# Patient Record
Sex: FEMALE | Race: WHITE | NOT HISPANIC OR LATINO | Employment: OTHER | URBAN - NONMETROPOLITAN AREA
[De-identification: names, ages, dates, MRNs, and addresses within clinical notes are randomized per-mention and may not be internally consistent; named-entity substitution may affect disease eponyms.]

---

## 2017-01-05 ENCOUNTER — HOSPITAL ENCOUNTER (OUTPATIENT)
Dept: CARDIOLOGY | Facility: HOSPITAL | Age: 47
Discharge: HOME | End: 2017-01-05
Attending: INTERNAL MEDICINE

## 2017-01-05 LAB
APTT BLD: 28 SEC (ref 25–36)
BASOPHILS # BLD AUTO: 0.11 THOUS (ref 0–0.2)
BASOPHILS NFR BLD AUTO: 1.3 % (ref 0–2.5)
CONV ABS IMM GRAN: 0.02 THOUS (ref 0–0.6)
CONV IMMATURE GRAN: 0.2 % (ref 0–2.5)
EOSINOPHIL # BLD AUTO: 0.52 THOUS (ref 0–0.7)
EOSINOPHIL # BLD AUTO: 6 % (ref 0–7)
ERYTHROCYTE [DISTWIDTH] IN BLOOD BY AUTOMATED COUNT: 12.5 % (ref 11.5–14.5)
HCG UR QL: NEGATIVE
HCT VFR BLD AUTO: 43 % (ref 37–47)
HGB BLD-MCNC: 14.2 G/DL (ref 12–16)
INR PPP: 1 (ref 0.9–1.1)
LYMPHOCYTES # BLD AUTO: 3.71 THOUS (ref 0.6–3.4)
LYMPHOCYTES NFR BLD AUTO: 42.5 % (ref 10–50)
MCH RBC QN AUTO: 31.9 UUG (ref 27–31)
MCHC RBC AUTO-ENTMCNC: 33 G/DL (ref 30–37)
MCV RBC AUTO: 96.6 FL (ref 81–99)
MONOCYTES # BLD AUTO: 0.71 THOUS (ref 0–0.9)
MONOCYTES NFR BLD MANUAL: 8.1 % (ref 0–12)
NEUTROPHILS # BLD MANUAL: 3.65 THOUS (ref 2–6.9)
NEUTROPHILS NFR BLD AUTO: 41.9 % (ref 37–80)
PLATELET # BLD AUTO: 228 THOUS (ref 130–400)
PROTHROMBIN TIME: 11.1 SEC (ref 9.3–12.1)
RBC # BLD AUTO: 4.45 M/UL (ref 4.2–5.4)
WBC # BLD AUTO: 8.7 THOUS (ref 4.8–10.8)

## 2017-01-20 ENCOUNTER — OFFICE VISIT (OUTPATIENT)
Dept: FAMILY MEDICINE CLINIC | Facility: CLINIC | Age: 47
End: 2017-01-20

## 2017-01-20 VITALS
RESPIRATION RATE: 16 BRPM | HEART RATE: 98 BPM | SYSTOLIC BLOOD PRESSURE: 127 MMHG | HEIGHT: 60 IN | DIASTOLIC BLOOD PRESSURE: 69 MMHG | OXYGEN SATURATION: 100 % | TEMPERATURE: 98 F | BODY MASS INDEX: 43 KG/M2 | WEIGHT: 219 LBS

## 2017-01-20 DIAGNOSIS — B18.2 CHRONIC HEPATITIS C WITHOUT HEPATIC COMA (HCC): Primary | ICD-10-CM

## 2017-01-20 PROCEDURE — 99213 OFFICE O/P EST LOW 20 MIN: CPT | Performed by: INTERNAL MEDICINE

## 2017-01-20 RX ORDER — PAROXETINE HYDROCHLORIDE 20 MG/1
TABLET, FILM COATED ORAL
COMMUNITY
Start: 2017-01-13 | End: 2017-07-24

## 2017-01-20 RX ORDER — OMEPRAZOLE 20 MG/1
CAPSULE, DELAYED RELEASE ORAL
COMMUNITY
Start: 2017-01-13 | End: 2020-03-12

## 2017-01-20 NOTE — MR AVS SNAPSHOT
"                        Dorita Shrestha   1/20/2017 3:15 PM   Office Visit    Dept Phone:  [phone]   Encounter #:  [id number]    Provider:  Remy Ceballos MD   Department:  [facility] PRIMARY CARE                Your Full Care Plan              Today's Medication Changes          These changes are accurate as of: 1/20/17  4:49 PM.  If you have any questions, ask your nurse or doctor.               Stop taking medication(s)listed here:     citalopram 20 MG tablet   Commonly known as:  CeleXA   Stopped by:  Remy Ceballos MD           raNITIdine 150 MG tablet   Commonly known as:  ZANTAC   Stopped by:  Remy Ceballos MD                      Your Updated Medication List          This list is accurate as of: 1/20/17  4:49 PM.  Always use your most recent med list.                hydrOXYzine 25 MG capsule   Commonly known as:  VISTARIL       ibuprofen 800 MG tablet   Commonly known as:  ADVIL,MOTRIN       omeprazole 20 MG capsule   Commonly known as:  priLOSEC       PARoxetine 20 MG tablet   Commonly known as:  PAXIL       SUBOXONE 8-2 MG film film   Generic drug:  buprenorphine-naloxone               Instructions     None    Patient Instructions History      Upcoming Appointments     Visit Type Date Time Department    OFFICE VISIT 1/20/2017  3:15 PM [facility]LIE QUINN R      [employer] Signup     Our records indicate that you have declined [facility] [employer] signup. If you would like to sign up for [employer], please email [email or web address] or call [phone] to obtain an activation code.             Other Info from Your Visit           Allergies     No Known Allergies      Reason for Visit     Hepatitis C Patient here to follow-up on hepatitis      Vital Signs     Blood Pressure Pulse Temperature Respirations Height Weight    127/69 (BP Location: Left arm, Patient Position: Sitting, Cuff Size: Adult) 98 98 °F (36.7 °C) (Oral) 16 60\" (152.4 cm) 219 lb (99.3 kg)    Oxygen Saturation Body " Mass Index Smoking Status             100% 42.77 kg/m2 Current Every Day Smoker

## 2017-01-24 NOTE — PROGRESS NOTES
Subjective   Dorita Shrestha is a 46 y.o. female.     Chief Complaint   Patient presents with   • Hepatitis C     Patient here to follow-up on hepatitis       History of Present Illness   Patient is here for f/u. She underwent liver biopsy. Initial application for approval of [place] was denies by her insurance.    The following portions of the patient's history were reviewed and updated as appropriate: allergies, current medications, past family history, past medical history, past social history, past surgical history and problem list.    Past Medical History   Diagnosis Date   • Arthritis    • Fibromyalgia, primary    • Headache    • Infectious viral hepatitis    • Kidney stone        Past Surgical History   Procedure Laterality Date   • Cholecystectomy     •  section     • Kidney stone surgery     • Anal fissurectomy         Current Outpatient Prescriptions on File Prior to Visit   Medication Sig Dispense Refill   • hydrOXYzine (VISTARIL) 25 MG capsule      • ibuprofen (ADVIL,MOTRIN) 800 MG tablet TK 1 T PO BID PRN P TO RIGHT SHOULDER  0   • SUBOXONE 8-2 MG film film        No current facility-administered medications on file prior to visit.        Social History     Social History   • Marital status: Legally      Spouse name: N/A   • Number of children: N/A   • Years of education: N/A     Occupational History   • Not on file.     Social History Main Topics   • Smoking status: Current Every Day Smoker     Packs/day: 0.50     Types: Cigarettes   • Smokeless tobacco: Never Used   • Alcohol use No   • Drug use: No   • Sexual activity: Defer     Other Topics Concern   • Not on file     Social History Narrative       Review of Systems   Constitutional: Negative for chills, fatigue and fever.   HENT: Negative for congestion, ear pain, rhinorrhea, sinus pressure and sore throat.    Eyes: Negative for visual disturbance.   Respiratory: Negative for cough, chest tightness, shortness of breath and  "wheezing.    Cardiovascular: Negative for chest pain, palpitations and leg swelling.   Gastrointestinal: Negative for abdominal pain, blood in stool, constipation, diarrhea, nausea and vomiting.   Endocrine: Negative for polydipsia and polyuria.   Genitourinary: Negative for dysuria and hematuria.   Musculoskeletal: Negative for back pain.   Skin: Negative for rash.   Neurological: Negative for dizziness, light-headedness, numbness and headaches.   Psychiatric/Behavioral: Negative for dysphoric mood and sleep disturbance. The patient is not nervous/anxious.        Objective   Blood pressure 127/69, pulse 98, temperature 98 °F (36.7 °C), temperature source Oral, resp. rate 16, height 60\" (152.4 cm), weight 219 lb (99.3 kg), SpO2 100 %.    Physical Exam   Constitutional: She is oriented to person, place, and time. She appears well-nourished. No distress.   HENT:   Head: Atraumatic.   Right Ear: External ear normal.   Left Ear: External ear normal.   Eyes: Conjunctivae are normal. Right eye exhibits no discharge. Left eye exhibits no discharge.   Neck: Normal range of motion. Neck supple.   Cardiovascular: Normal rate and regular rhythm.    No murmur heard.  Pulmonary/Chest: Effort normal and breath sounds normal. She has no wheezes. She has no rales.   Abdominal: Soft. Bowel sounds are normal. She exhibits no distension. There is no tenderness.   Neurological: She is alert and oriented to person, place, and time.   Skin: No rash noted. She is not diaphoretic.   Psychiatric: She has a normal mood and affect.   Nursing note and vitals reviewed.      Assessment/Plan   Dorita was seen today for hepatitis c.    Diagnoses and all orders for this visit:    Chronic hepatitis C without hepatic coma      Will resubmit the request for approval of [place] for treatment of Hep C, genotype 1.           Return in about 4 weeks (around 2/17/2017).    There are no Patient Instructions on file for this visit.  "

## 2017-07-24 ENCOUNTER — OFFICE VISIT (OUTPATIENT)
Dept: PSYCHIATRY | Facility: CLINIC | Age: 47
End: 2017-07-24

## 2017-07-24 VITALS — WEIGHT: 214 LBS | BODY MASS INDEX: 42.01 KG/M2 | HEIGHT: 60 IN

## 2017-07-24 DIAGNOSIS — B18.2 CHRONIC HEPATITIS C WITHOUT HEPATIC COMA (HCC): ICD-10-CM

## 2017-07-24 DIAGNOSIS — F41.9 ANXIETY DISORDER, UNSPECIFIED TYPE: Primary | ICD-10-CM

## 2017-07-24 PROCEDURE — 90792 PSYCH DIAG EVAL W/MED SRVCS: CPT | Performed by: NURSE PRACTITIONER

## 2017-07-24 RX ORDER — SERTRALINE HYDROCHLORIDE 25 MG/1
25 TABLET, FILM COATED ORAL DAILY
Qty: 30 TABLET | Refills: 1 | Status: SHIPPED | OUTPATIENT
Start: 2017-07-24 | End: 2020-03-12

## 2017-07-24 RX ORDER — MIRTAZAPINE 15 MG/1
15 TABLET, FILM COATED ORAL NIGHTLY
Qty: 30 TABLET | Refills: 1 | Status: SHIPPED | OUTPATIENT
Start: 2017-07-24 | End: 2020-03-12
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
## 2019-10-22 ENCOUNTER — TRANSCRIBE ORDERS (OUTPATIENT)
Dept: ADMINISTRATIVE | Facility: HOSPITAL | Age: 49
End: 2019-10-22

## 2019-10-22 DIAGNOSIS — Z12.31 BREAST CANCER SCREENING BY MAMMOGRAM: Primary | ICD-10-CM

## 2020-02-18 ENCOUNTER — OFFICE VISIT (OUTPATIENT)
Dept: NEUROSURGERY | Facility: CLINIC | Age: 50
End: 2020-02-18

## 2020-02-18 VITALS — TEMPERATURE: 97.6 F | BODY MASS INDEX: 42.01 KG/M2 | WEIGHT: 214 LBS | HEIGHT: 60 IN

## 2020-02-18 DIAGNOSIS — T07.XXXA FRACTURES: ICD-10-CM

## 2020-02-18 DIAGNOSIS — M51.36 DDD (DEGENERATIVE DISC DISEASE), LUMBAR: Primary | ICD-10-CM

## 2020-02-18 PROCEDURE — 99243 OFF/OP CNSLTJ NEW/EST LOW 30: CPT | Performed by: NEUROLOGICAL SURGERY

## 2020-02-18 NOTE — PROGRESS NOTES
Subjective   Patient ID: Dorita Shrestha is a 49 y.o. female is being seen for consultation today at the request of Ashley Whitehead,*  Chief Complaint: Back and neck pain    History of Present Illness: The patient is a 49-year-old woman from [place] who has a history of pain in her lower back since lifting an air conditioner last summer about June.  She was found to have thoracolumbar fractures, but apparently they were felt to be older than the injury.  Her pain is felt in the lumbosacral region rather than the thoracolumbar region.  She has lost about 50 pounds with a keto diet.  Her weight is still 214 pounds.  She is anxious to begin some form of exercise using weights to help progress in her recovery.    Review of Radiographic Studies:  Lumbar MRI scan dated 1/25/2020 was reviewed.  There is a transitional L5-S1 vertebral segment.  If the first mobile segment is counted as L5-S1, there are 2 compression fractures noted one at T12, and one at L1.  These are old fractures, and in particular the T12-L1 level shows an anterior osteophyte.  T1 imaging does not show any evidence of acute inflammatory or signal change within the bone.  At the lumbosacral level there is facet arthropathy bilaterally at L4 and L5.  There is a mild degenerative disc at L5-S1.    The following portions of the patient's history were reviewed, updated as appropriate and approved: allergies, current medications, past family history, past medical history, past social history, past surgical history, review of systems and problem list.  Review of Systems   Musculoskeletal: Positive for back pain and neck pain.   All other systems reviewed and are negative.      Objective     NEUROLOGICAL EXAMINATION:      MENTAL STATUS:  Alert and oriented.  Speech intact.  Recent and remote memory intact.      CRANIAL NERVES:  Cranial nerve II:  Visual fields are full.  Cranial nerves III, IV and VI:  PERRLADC.  Extraocular movements are intact.   Nystagmus is not present.  Cranial nerve V:  Facial sensation is intact.  Cranial nerve VII:  Muscles of facial expression reveal no asymmetry.  Cranial nerve VIII:  Hearing is intact.  Cranial nerves IX and X:  Palate elevates symmetrically.  Cranial nerve XI:  Shoulder shrug is intact.  Cranial nerve XII:  Tongue is midline without evidence of atrophy or fasciculation.    MUSCULOSKELETAL: SLR negative bilaterally.  Weight 214 pounds.    MOTOR: Intact knee extension, ankle dorsiflexion, plantarflexion.    SENSATION: No focal sensory loss of the lower extremities.    REFLEXES:  DTR trace both knees and both ankles.    Assessment   Low back pain associated with facet arthropathy L4-5 and L5-S1.  Old healed thoracolumbar fractures at T12 and L1.       Plan   Physical therapy.  Pain management referral to see if facet injection or rhizotomy is a possible alternative.       Martin Rubio MD

## 2020-02-19 ENCOUNTER — TELEPHONE (OUTPATIENT)
Dept: PAIN MEDICINE | Facility: CLINIC | Age: 50
End: 2020-02-19

## 2020-03-06 PROBLEM — Z87.81 HISTORY OF COMPRESSION FRACTURE OF VERTEBRAL COLUMN: Status: ACTIVE | Noted: 2020-03-06

## 2020-03-06 PROBLEM — M51.37 DEGENERATION OF LUMBAR OR LUMBOSACRAL INTERVERTEBRAL DISC: Status: ACTIVE | Noted: 2020-02-18

## 2020-03-06 PROBLEM — M47.816 SPONDYLOSIS OF LUMBAR REGION WITHOUT MYELOPATHY OR RADICULOPATHY: Status: ACTIVE | Noted: 2020-03-06

## 2020-03-06 NOTE — PROGRESS NOTES
"Chief Complaint: \"Pain in my lower back.\"         History of Present Illness:   Patient: Ms. Dorita Shrestha, 49 y.o. female   Referring Physician: Dr. Martin Rubio   Reason for Referral: Consultation for chronic intractable lower back pain.   Pain History: Patient reports a 9-month history of pain, which began after lifting an air conditioner unit about June 2019. She has lost about 50 pounds with a keto diet. Pain has progressed in intensity over the past 9 months.   Pain Description: Constant lumbosacral pain with intermittent exacerbation, described as aching and stabbing sensation.   Radiation of Pain: The pain does not radiate.   Pain intensity today: 6/10   Average pain intensity last week: 5/10   Pain intensity ranges from: 4/10 to 9/10   Aggravating factors: Pain increases with twisting, bending, standing and walking.   Alleviating factors: Pain decreases with lying down.     Associated Symptoms:   Patient denies pain, numbness or weakness in the lower extremities.   Patient denies any new bladder or bowel problems.   Patient denies difficulties with her balance or recent falls     Review of previous therapies and additional medical records:   Dorita Shrestha has already failed the following measures, including:   Conservative Measures: Oral analgesics and physical therapy   Interventional Measures: None   Surgical Measures: No previous history of lumbar spine surgery   Dorita Shrestha underwent neurosurgical consultation with Dr. Martin Rubio on 02/18/2020, and was found not to be a surgical candidate.   Dorita Shrestha presents with significant comorbidities including Hx substance abuse, nicotine addiction, moderate obesity  In terms of current analgesics, Dorita Shrestha takes: Ibuprofen.   I have reviewed [facility] Report #[id number] to medication reconciliation.     Global Pain Scale 03-12 2020          Pain 18          Feelings 10          Clinical outcomes 14          Activities 18        "   GPS Total: 60            Review of Diagnostic Studies:    MRI of the lumbar spine without contrast on 2020, radiology report: There is lumbarization of S1. Superior endplate compression deformity at L1. There is approximately 50% anterior height loss. There is some minimal abnormal STIR signal associated which could represent incomplete healing. Vertebral body height and alignment are otherwise maintained. There are minimal degenerative discogenic changes without significant central canal or foraminal stenosis.    Review of Systems   Respiratory: Positive for apnea.    Genitourinary: Positive for pelvic pain.   Musculoskeletal: Positive for arthralgias, back pain, myalgias, neck pain and neck stiffness.   Neurological: Positive for weakness and numbness.   All other systems reviewed and are negative.        Patient Active Problem List   Diagnosis   • Degeneration of lumbar or lumbosacral intervertebral disc   • Spondylosis of lumbar region without myelopathy or radiculopathy   • History of compression fracture of vertebral column   • Current every day smoker   • Tobacco abuse counseling   • History of substance abuse (CMS/[facility])       Past Medical History:   Diagnosis Date   • Arthritis    • Fibromyalgia, primary    • Headache    • Infectious viral hepatitis    • Kidney stone          Past Surgical History:   Procedure Laterality Date   • ANAL FISSURECTOMY     •  SECTION     • CHOLECYSTECTOMY     • KIDNEY STONE SURGERY           Family History   Problem Relation Age of Onset   • Arthritis Mother    • Arthritis Father    • Thyroid disease Father    • Migraines Sister    • Cancer Maternal Grandmother          Social History     Socioeconomic History   • Marital status: Legally      Spouse name: Not on file   • Number of children: Not on file   • Years of education: Not on file   • Highest education level: Not on file   Tobacco Use   • Smoking status: Current Every Day Smoker     Packs/day:  "0.50     Types: Cigarettes   • Smokeless tobacco: Never Used   Substance and Sexual Activity   • Alcohol use: No   • Drug use: No   • Sexual activity: Defer        Current Outpatient Medications:   •  ibuprofen (ADVIL,MOTRIN) 800 MG tablet, TK 1 T PO BID PRN P TO RIGHT SHOULDER, Disp: , Rfl: 0      No Known Allergies      /72   Pulse 78   Resp 16   Ht 152.4 cm (60\")   Wt 83 kg (183 lb)   SpO2 96%   Breastfeeding No   BMI 35.74 kg/m²       Physical Exam:  Constitutional: Patient is oriented to person, place, and time.   Patient appears well-developed and well-nourished.   HEENT: Head: Normocephalic and atraumatic.   Eyes: Conjunctivae and lids are normal.   Pupils: Equal, round, reactive to light.   Neck: Trachea normal. Neck supple. No JVD present.   Pulmonary Respiratory effort: No increased work of breathing or signs of respiratory distress. Auscultation of lungs: Clear to auscultation.   Cardiovascular Auscultation of heart: Normal rate and rhythm, normal S1 and S2, no murmurs.   Musculoskeletal   Gait and station: Gait evaluation demonstrated a normal gait.  Patient was able to walk on her heels and toes  Lumbar spine: Passive and active range of motion are limited secondary to pain. Extension, lateral flexion, rotation of the lumbar spine increased and reproduced pain. Lumbar facet joint loading maneuvers are positive.  Octaviano test and Gaenslen's test are negative   Piriformis maneuvers are negative   Palpation of the bilateral ischial tuberosities, unrevealing   Palpation of the bilateral greater trochanter, unrevealing   Examination of the Iliotibial band: unrevealing   Hip joints: The range of motion of the hip joints is almost full and without pain   Neurological:   Patient is alert and oriented to person, place, and time.   Speech: speech is normal.   Cortical function: Normal mental status.   Cranial nerves: Cranial nerves 2-12 intact.   Reflex Scores:  Right patellar: 2+  Left patellar: " 2+  Right Achilles: 2+  Left Achilles: 2+  Motor strength: 5/5  Motor Tone: normal tone.   Involuntary movements: none.   Superficial/Primitive Reflexes: primitive reflexes were absent.   Right Ridley: absent  Left Ridley: absent  Right ankle clonus: absent  Left ankle clonus: absent   Babinsky: absent  Negative long tract signs. Straight leg raising test is negative. Femoral stretch sign is negative.   Sensation: No sensory loss. Sensory exam: intact to light touch, intact pain and temperature sensation, intact vibration sensation and normal proprioception.   Coordination: Normal finger to nose and heel to shin. Normal balance and negative Romberg's sign   Skin and subcutaneous tissue: Skin is warm and intact. No rash noted. No cyanosis.   Psychiatric: Judgment and insight: Normal. Orientation to person, place and time: Normal. Recent and remote memory: Intact. Mood and affect: Normal.     ASSESSMENT:   1. Spondylosis of lumbar region without myelopathy or radiculopathy    2. Degeneration of lumbar or lumbosacral intervertebral disc    3. History of compression fracture of vertebral column    4. History of substance abuse (CMS/[facility])    5. Current every day smoker    6. Tobacco abuse counseling        PLAN/MEDICAL DECISION MAKING: I had a lengthy conversation with Ms. Dorita Shrestha regarding her chronic pain condition and potential therapeutic options including risks, benefits, alternative therapies, to name a few.  Patient presents with a complex history of chronic pain.  Patient has history of substance abuse and reports that she has been sober for the past 40 years.  Her pain appears to be mostly axial and mechanical. MRI of the lumbar spine on 01/25/2020 revealed 2 old compression fractures T12, and L1. Lumbar facet arthropathy bilaterally at L4-L5 and L5-S1. Mild degenerative disc disease at L5-S1. Patient has failed to obtain pain relief with conservative measures, as referenced above. I have reviewed all  available patient's medical records as well as previous therapies as referenced above.  Therefore, I have proposed the following plan:  1. Pharmacological measures: Reviewed. Discussed. Patient takes ibuprofen PRN.   2. Interventional pain management measures: Patient will be scheduled for diagnostic and therapeutic lumbar facet joint injections bilateral L4-L5, bilateral L5-S1. We may repeat procedure depending on patient's outcome. If patient fails to obtain sustained pain relief, then, we will proceed with diagnostic bilateral lumbar medial branch blocks at L3, L4, L5; for bilateral lumbar facet joints at L4-L5, L5-S1 to clarify the origin of chronic refractory mechanical lower back pain. If patient experiences more than 80% relief along with significant improvement the range of motion of the lumbar spine, then, patient will be scheduled for a second set of diagnostic bilateral lumbar medial branch blocks, to then, proceed with bilateral lumbar medial branch rhizotomies. Otherwise, we may consider LES vs diagnostic and therapeutic bilateral L4-L5 transforaminal epidural steroid injections versus provocative lumbar discography to clarify the origin of her chronic pain  3. Long-term rehabilitation efforts:  A. The patient does not have a history of falls. I did complete a risk assessment for falls  B. Patient will start a comprehensive physical therapy program for water therapy, therapeutic exercise, core strengthening, gait and balance training, neurodynamics, ultrasound, myofascial release, cupping and dry needling   C. Start an exercise program such as yoga, water therapy and daily walks for fitness  D. Contrast therapy: Apply ice-packs for 15-20 minutes, followed by heating pads for 15-20 minutes to affected area   E. Referral to Dr. Surjit Crum for cognitive behavioral therapy, biofeedback and assistance in the development of effective coping skills.  F. Referral to [facility]  [place]  G. Patient has been screened for tobacco use: Current tobacco user. Smoking Cessation: I have advised the patient at length regarding the long-term deleterious effects of smoking and have provided the patient lifestyle modification strategies to facilitate smoking cessation. For instance, I have instructed the patient to delay the time that he lights his first cigarette in the morning from 1 hour to 2 hours and to continue pushing back 30-60 minutes, if possible, every day for the rest of the week. We have also discussed pharmacological interventions in addition to participation in support groups, individual counseling, to name a few to facilitate smoking/nicotine cessation. I spent approximately 10 minutes advising the patient. Start nicotine patches 21 mg one patch daily for 4 weeks, then Nicotine patches 14 mg one patch daily for 2 weeks, then Nicotine patches 7 mg one patch daily for 2 weeks, then, discontinue. Patient has been instructed to cut down or stop as he starts nicotine replacement therapy  4. The patient and her family have been instructed to contact my office with any questions or difficulties. The patient understands the plan and agrees to proceed accordingly.           Patient Care Team:  Ashley Whitehead APRN as PCP - General (Family Medicine)  Remy Ceballos MD as Consulting Physician (Infectious Diseases)  Ashley Whitehead APRN as Referring Physician (Family Medicine)     No orders of the defined types were placed in this encounter.        No future appointments.      Juan Ramon Chaudhary MD     EMR Dragon/Transcription disclaimer:  Much of this encounter note is an electronic transcription of spoken language to printed text. Electronic transcription of spoken language may permit erroneous, or at times, nonsensical words or phrases to be inadvertently transcribed. Although I have reviewed the note for such errors, some may still exist.

## 2020-03-12 ENCOUNTER — OFFICE VISIT (OUTPATIENT)
Dept: PAIN MEDICINE | Facility: CLINIC | Age: 50
End: 2020-03-12

## 2020-03-12 VITALS
OXYGEN SATURATION: 96 % | DIASTOLIC BLOOD PRESSURE: 72 MMHG | SYSTOLIC BLOOD PRESSURE: 118 MMHG | WEIGHT: 183 LBS | RESPIRATION RATE: 16 BRPM | HEIGHT: 60 IN | HEART RATE: 78 BPM | BODY MASS INDEX: 35.93 KG/M2

## 2020-03-12 DIAGNOSIS — M51.37 DEGENERATION OF LUMBAR OR LUMBOSACRAL INTERVERTEBRAL DISC: ICD-10-CM

## 2020-03-12 DIAGNOSIS — F19.11 HISTORY OF SUBSTANCE ABUSE (HCC): ICD-10-CM

## 2020-03-12 DIAGNOSIS — Z87.81 HISTORY OF COMPRESSION FRACTURE OF VERTEBRAL COLUMN: ICD-10-CM

## 2020-03-12 DIAGNOSIS — M47.816 SPONDYLOSIS OF LUMBAR REGION WITHOUT MYELOPATHY OR RADICULOPATHY: ICD-10-CM

## 2020-03-12 DIAGNOSIS — Z72.0 TOBACCO ABUSE: Primary | ICD-10-CM

## 2020-03-12 DIAGNOSIS — F17.200 CURRENT EVERY DAY SMOKER: ICD-10-CM

## 2020-03-12 DIAGNOSIS — M51.37 DEGENERATION OF LUMBAR OR LUMBOSACRAL INTERVERTEBRAL DISC: Primary | ICD-10-CM

## 2020-03-12 DIAGNOSIS — Z71.6 TOBACCO ABUSE COUNSELING: ICD-10-CM

## 2020-03-12 PROCEDURE — 99204 OFFICE O/P NEW MOD 45 MIN: CPT | Performed by: ANESTHESIOLOGY

## 2020-03-12 PROCEDURE — 99406 BEHAV CHNG SMOKING 3-10 MIN: CPT | Performed by: ANESTHESIOLOGY

## 2020-03-12 RX ORDER — NICOTINE 21-14-7MG
KIT TRANSDERMAL
Qty: 1 EACH | Refills: 0 | Status: SHIPPED | OUTPATIENT
Start: 2020-03-12 | End: 2021-01-11 | Stop reason: SDDI

## 2021-01-11 ENCOUNTER — OFFICE VISIT (OUTPATIENT)
Dept: PSYCHIATRY | Facility: CLINIC | Age: 51
End: 2021-01-11

## 2021-01-11 VITALS
BODY MASS INDEX: 37.11 KG/M2 | WEIGHT: 189 LBS | RESPIRATION RATE: 16 BRPM | SYSTOLIC BLOOD PRESSURE: 118 MMHG | HEIGHT: 60 IN | TEMPERATURE: 97.8 F | DIASTOLIC BLOOD PRESSURE: 74 MMHG | HEART RATE: 70 BPM

## 2021-01-11 DIAGNOSIS — E66.01 MORBIDLY OBESE (HCC): ICD-10-CM

## 2021-01-11 DIAGNOSIS — F33.2 SEVERE EPISODE OF RECURRENT MAJOR DEPRESSIVE DISORDER, WITHOUT PSYCHOTIC FEATURES (HCC): Primary | ICD-10-CM

## 2021-01-11 DIAGNOSIS — F51.05 INSOMNIA DUE TO MENTAL DISORDER: ICD-10-CM

## 2021-01-11 DIAGNOSIS — F41.1 GENERALIZED ANXIETY DISORDER: ICD-10-CM

## 2021-01-11 PROCEDURE — 90792 PSYCH DIAG EVAL W/MED SRVCS: CPT | Performed by: NURSE PRACTITIONER

## 2021-01-11 RX ORDER — BREXPIPRAZOLE 0.5 MG/1
0.5 TABLET ORAL DAILY
Qty: 30 TABLET | Refills: 2 | Status: SHIPPED | OUTPATIENT
Start: 2021-01-11 | End: 2021-02-22

## 2021-01-11 RX ORDER — ESCITALOPRAM OXALATE 10 MG/1
TABLET ORAL
COMMUNITY
Start: 2021-01-07 | End: 2021-02-22 | Stop reason: SDUPTHER

## 2021-01-11 RX ORDER — HYDROXYZINE PAMOATE 25 MG/1
25-50 CAPSULE ORAL
Qty: 60 CAPSULE | Refills: 2 | Status: SHIPPED | OUTPATIENT
Start: 2021-01-11
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
## 2021-02-22 ENCOUNTER — TELEMEDICINE (OUTPATIENT)
Dept: PSYCHIATRY | Facility: CLINIC | Age: 51
End: 2021-02-22

## 2021-02-22 DIAGNOSIS — F41.1 GENERALIZED ANXIETY DISORDER: Chronic | ICD-10-CM

## 2021-02-22 DIAGNOSIS — G47.00 INSOMNIA DISORDER RELATED TO KNOWN ORGANIC FACTOR: ICD-10-CM

## 2021-02-22 DIAGNOSIS — E66.01 MORBIDLY OBESE (HCC): ICD-10-CM

## 2021-02-22 DIAGNOSIS — F33.2 SEVERE EPISODE OF RECURRENT MAJOR DEPRESSIVE DISORDER, WITHOUT PSYCHOTIC FEATURES (HCC): Primary | Chronic | ICD-10-CM

## 2021-02-22 PROCEDURE — 99214 OFFICE O/P EST MOD 30 MIN: CPT | Performed by: NURSE PRACTITIONER

## 2021-02-22 RX ORDER — BREXPIPRAZOLE 1 MG/1
1 TABLET ORAL DAILY
Qty: 30 TABLET | Refills: 2 | Status: SHIPPED | OUTPATIENT
Start: 2021-02-22 | End: 2021-03-29

## 2021-02-22 RX ORDER — ESCITALOPRAM OXALATE 20 MG/1
20 TABLET ORAL EVERY MORNING
Qty: 30 TABLET | Refills: 2 | Status: SHIPPED | OUTPATIENT
Start: 2021-02-22 | End: 2021-06-03 | Stop reason: SDUPTHER

## 2021-02-22 NOTE — PROGRESS NOTES
"This provider is located at [facility], Behavioral Health ,[address], [address] in [place], [place],using a secure Nanoleafhart Video Visit through [employer]. Patient is being seen remotely via telehealth at their home address in [place], and stated they are in a secure environment for this session. The patient's condition being diagnosed/treated is appropriate for telemedicine. The provider identified herself as well as her credentials.   The patient, and/or patients guardian, consent to be seen remotely, and when consent is given they understand that the consent allows for patient identifiable information to be sent to a third party as needed.   They may refuse to be seen remotely at any time. The electronic data is encrypted and password protected, and the patient and/or guardian has been advised of the potential risks to privacy not withstanding such measures.    Chief Complaint  Anxiety, Depression, and Sleeping Problem      Subjective          Dorita Shrestha presents today via Nanoleafhart Video through [employer] for medication management of her depression, anxiety, and sleeping difficulties.    History of Present Illness: Patient presents today via [employer] video as follow-up after last being seen on 01/11/2021.  At previous appointment, patient was started on Vistaril 25 to 50 mg nightly, and Rexulti 0.5mg daily.  Patient presents today and reports she feels \"about the same, I do not know, maybe worse.  I do not really know\".  Patient reports she is still struggling heavily with her sleep, saying she wakes up every 1-2 hours.  Patient also reveals, which was not known at her last appointment, that she had a sleep study done several years ago, and was diagnosed with sleep apnea.  She reports she used a CPAP for a brief time, but did not like it.  Patient reports she struggles with \"waking up and thinking about things a lot\".  Patient denies any issues with her appetite.  Patient reports she has " been on the Lexapro 10 mg daily for approximately 5 months.  She reports she has not noticed any improvement in her mood with the Lexapro, or the addition of the Rexulti at her previous appointment.  Patient denies any SI/HI, A/V hallucinations.    Current Medications:   Current Outpatient Medications   Medication Sig Dispense Refill   • escitalopram (LEXAPRO) 20 MG tablet Take 1 tablet by mouth Every Morning. 30 tablet 2   • hydrOXYzine pamoate (Vistaril) 25 MG capsule Take 1-2 capsules by mouth every night at bedtime. 60 capsule 2   • Brexpiprazole (Rexulti) 1 MG tablet Take 1 mg by mouth Daily. 30 tablet 2     No current facility-administered medications for this visit.        Mental Status Exam:   Hygiene:   MyChart video visit, unable to determine.  Cooperation:  Cooperative  Eye Contact:  Fair  Psychomotor Behavior:  Slow  Affect:  Appropriate  Mood: depressed  Speech:  Minimal and Monotone  Thought Process:  Goal directed  Thought Content:  Mood congruent  Suicidal:  None  Homicidal:  None  Hallucinations:  None  Delusion:  None  Memory:  Intact  Orientation:  Person, Place, Time and Situation  Reliability:  good  Insight:  Fair  Judgement:  Good  Impulse Control:  Good  Physical/Medical Issues:  No      Objective   Vital Signs:   There were no vitals taken for this visit.    Physical Exam  Neurological:      Mental Status: She is oriented to person, place, and time. Mental status is at baseline.   Psychiatric:         Mood and Affect: Affect normal. Mood is depressed.         Behavior: Behavior is slowed.         Thought Content: Thought content is not paranoid or delusional. Thought content does not include homicidal or suicidal ideation. Thought content does not include homicidal or suicidal plan.         Cognition and Memory: Cognition and memory normal.         Judgment: Judgment normal.        Result Review :     The following data was reviewed by: RONIT Summers on 02/22/2021:    Data reviewed:  Previous note, and medication history.          Assessment and Plan    Problem List Items Addressed This Visit        Endocrine and Metabolic    Morbidly obese (CMS/HCC)      Other Visit Diagnoses     Severe episode of recurrent major depressive disorder, without psychotic features (CMS/HCC)  (Chronic)   -  Primary    Relevant Medications    escitalopram (LEXAPRO) 20 MG tablet    Brexpiprazole (Rexulti) 1 MG tablet    Generalized anxiety disorder  (Chronic)       Relevant Medications    escitalopram (LEXAPRO) 20 MG tablet    Brexpiprazole (Rexulti) 1 MG tablet    Insomnia disorder related to known organic factor              PHQ-9 Score:   PHQ-9 Total Score: 15    Depression Screening:  Patient screened positive for depression based on a PHQ-9 score of 15 on 2/22/2021. Follow-up recommendations include: Prescribed antidepressant medication treatment and Suicide Risk Assessment performed.       Tobacco Cessation:  Dorita Shrestha  reports that she has been smoking cigarettes. She has been smoking about 1.00 pack per day. She has never used smokeless tobacco.. I have educated her on the risk of diseases from using tobacco products such as cancer, COPD and heart disease.     I advised her to quit and she is not willing to quit.    I spent 3  minutes counseling the patient.    Impression/Plan:  -This is my first follow-up appoint with the patient.  Patient reports she has not noticed any improvement in her mood since her last visit.  At last visit patient was started on Rexulti 0.5mg, to combined with her Lexapro 10 mg daily.  Patient was also started on Vistaril to help with anxiety, and sleep.  Patient reports her sleep is still very poor, however it is now known that patient has sleep apnea.  Advised the patient if she is not going to wear a CPAP, it is unlikely medication is going to help her with her sleep.  She reports she had a new sleep study scheduled, but had to cancel it because she did not have anyone to  watch her son overnight while she did it.  Patient reported using a CPAP in the past, but reports it broke, and she did not like wearing it anyway, so she never replaced it.  -Increase Lexapro to 20 mg daily.  New order sent.  -Increase Rexulti to 1 mg daily.  New order sent.  -Maintain Vistaril 25 to 50 mg nightly.  Patient has refills.  -Schedule follow-up for 1 month or as needed.    MEDS ORDERED DURING VISIT:  New Medications Ordered This Visit   Medications   • escitalopram (LEXAPRO) 20 MG tablet     Sig: Take 1 tablet by mouth Every Morning.     Dispense:  30 tablet     Refill:  2   • Brexpiprazole (Rexulti) 1 MG tablet     Sig: Take 1 mg by mouth Daily.     Dispense:  30 tablet     Refill:  2         Follow Up   Return in about 1 month (around 3/22/2021), or if symptoms worsen or fail to improve, for Next scheduled follow up.  Patient was given instructions and counseling regarding her condition or for health maintenance advice. Please see specific information pulled into the AVS if appropriate.       TREATMENT PLAN/GOALS: Continue supportive psychotherapy efforts and medications as indicated. Treatment and medication options discussed during today's visit. Patient acknowledged and verbally consented to continue with current treatment plan and was educated on the importance of compliance with treatment and follow-up appointments.    MEDICATION ISSUES:  Discussed medication options and treatment plan of prescribed medication as well as the risks, benefits, and side effects including potential falls, possible impaired driving and metabolic adversities among others. Patient is agreeable to call the office with any worsening of symptoms or onset of side effects. Patient is agreeable to call 911 or go to the nearest ER should he/she begin having SI/HI.          This document has been electronically signed by RONIT Dash, PMHNP-BC  February 22, 2021 15:22 EST      Part of this note may be an  electronic transcription/translation of spoken language to printed text using the Dragon Dictation System.

## 2021-03-11 ENCOUNTER — TELEMEDICINE (OUTPATIENT)
Dept: PSYCHIATRY | Facility: CLINIC | Age: 51
End: 2021-03-11

## 2021-03-11 DIAGNOSIS — F33.2 SEVERE EPISODE OF RECURRENT MAJOR DEPRESSIVE DISORDER, WITHOUT PSYCHOTIC FEATURES (HCC): Primary | ICD-10-CM

## 2021-03-11 PROCEDURE — 90791 PSYCH DIAGNOSTIC EVALUATION: CPT | Performed by: SOCIAL WORKER

## 2021-03-18 NOTE — PROGRESS NOTES
Patient ID: Dorita Shrestha is a 50 y.o. female presenting to [facility] for assessment with Eunice Perera LCSW.     Time: 1:30 pm   Time out: 2:30 pm     Description of current emotional/behavioral concerns: Patient states she has experienced anxiety and depression for many years. Patient states she would like to divorce her  because he is unhappy but she believes she must stay because no one else will take care of him. Patient had connection issues throughout session due to poor Internet connection, and session had to be switched to a phone call. Patient states that she is very unhappy and she does not know why. Patient struggled to provide much feedback to therapist on her goals for treatment or history. Patient appeared guarded during session.     Patient adamantly and convincingly denies current suicidal or homicidal ideation or perceptual disturbance.    Significant Life Events  Has patient been through or witnessed a divorce? no      Has patient experienced a death / loss of relationship? no      Has patient experienced a major accident or tragic events? no      Has patient experienced any other significant life events or trauma (such as verbal, physical, sexual abuse)? no      Work History  Highest level of education obtained: 12th grade    Ever been active duty in the ? no    Interpersonal/Relational  Marital Status:   Patient's current living situation: lives with  and children  Support system: Unknown  Difficulty getting along with peers: no  Difficulty making new friendships: no  Difficulty maintaining friendships: no  Close with family members: no      Family History   Problem Relation Age of Onset   • Arthritis Mother    • Depression Mother    • Arthritis Father    • Thyroid disease Father    • Migraines Sister    • Anxiety disorder Sister    • Depression Sister    • Cancer Maternal Grandmother    • ADD / ADHD Son    • OCD Son     • Anxiety disorder Sister    • Depression Sister    • Self-Injurious Behavior  Daughter    • Suicide Attempts Daughter    • Alcohol abuse Neg Hx    • Bipolar disorder Neg Hx    • Dementia Neg Hx    • Drug abuse Neg Hx    • Paranoid behavior Neg Hx    • Schizophrenia Neg Hx    • Seizures Neg Hx        Current Medications:   Current Outpatient Medications   Medication Sig Dispense Refill   • Brexpiprazole (Rexulti) 1 MG tablet Take 1 mg by mouth Daily. 30 tablet 2   • escitalopram (LEXAPRO) 20 MG tablet Take 1 tablet by mouth Every Morning. 30 tablet 2   • hydrOXYzine pamoate (Vistaril) 25 MG capsule Take 1-2 capsules by mouth every night at bedtime. 60 capsule 2     No current facility-administered medications for this visit.     SUICIDE RISK ASSESSMENT/CSSRS  1. Does patient have thoughts of suicide? no  2. Does patient have intent for suicide? no  3. Does patient have a current plan for suicide? no  4. History of suicide attempts: no  5. Family history of suicide or attempts: no  6. History of violent behaviors towards others or property or thoughts of committing suicide: no  7. History of sexual aggression toward others: no  8. Access to firearms or weapons: no    Mental Status Exam:   Hygiene:   good  Cooperation:  Guarded  Eye Contact:  Poor  Psychomotor Behavior:  Slow  Affect:  Restricted  Mood: depressed  Hopelessness: Denies  Speech:  Minimal and Monotone  Thought Process:  Unable to demonstrate  Thought Content:  Normal  Suicidal:  None  Homicidal:  None  Hallucinations:  None  Delusion:  None  Memory:  Intact  Orientation:  Person, Place, Time and Situation  Reliability:  poor  Insight:  Poor  Judgement:  Fair  Impulse Control:  Fair    Impression/Formulation:    VISIT DIAGNOSIS:     ICD-10-CM ICD-9-CM   1. Severe episode of recurrent major depressive disorder, without psychotic features (CMS/[facility])  F33.2 296.33        Patient appeared alert and oriented.  Patient is voluntarily requesting to begin  outpatient therapy at [facility].  Patient is receptive to assistance with maintaining a stable lifestyle.  Patient presents with history of depression.  Patient is agreeable to attend routine therapy sessions.  Patient expressed desire to maintain stability and participate in the therapeutic process.        Crisis Plan:  Symptoms and/or behaviors to indicate a crisis: Feeling sad or low    What calming techniques or other strategies will patient use to de-esclate and stay safe: slow down, breathe, visualize calming self, think it though, listen to music, change focus, take a walk    Who is one person patient can contact to assist with de-escalation? Her mother    If symptoms/behaviors persist, patient will present to the nearest hospital for an assessment. Advised patient of [facility] ER 24/7 assessment services.       Plan:   Obtain release of information for current treatment team for continuity of care  Patient will adhere to medication regimen as prescribed and report any side effects. Patient will contact this office, call 911 or present to the nearest emergency room should suicidal or homicidal ideations occur.  Begin psychotherapy         This document has been electronically signed by NICK Porter, RICKY  March 22, 2021 14:22 EDT    Part of this note may be an electronic transcription/translation of spoken language to printed text using the Dragon Dictation System.

## 2021-03-29 ENCOUNTER — TELEMEDICINE (OUTPATIENT)
Dept: PSYCHIATRY | Facility: CLINIC | Age: 51
End: 2021-03-29

## 2021-03-29 DIAGNOSIS — F33.2 SEVERE EPISODE OF RECURRENT MAJOR DEPRESSIVE DISORDER, WITHOUT PSYCHOTIC FEATURES (HCC): Chronic | ICD-10-CM

## 2021-03-29 DIAGNOSIS — F41.1 GENERALIZED ANXIETY DISORDER: Primary | Chronic | ICD-10-CM

## 2021-03-29 DIAGNOSIS — F51.05 INSOMNIA DUE TO MENTAL DISORDER: Chronic | ICD-10-CM

## 2021-03-29 PROCEDURE — 99214 OFFICE O/P EST MOD 30 MIN: CPT | Performed by: NURSE PRACTITIONER

## 2021-03-29 RX ORDER — BREXPIPRAZOLE 2 MG/1
2 TABLET ORAL DAILY
Qty: 30 TABLET | Refills: 2 | Status: SHIPPED | OUTPATIENT
Start: 2021-03-29 | End: 2021-06-03 | Stop reason: SDUPTHER

## 2021-03-29 NOTE — PROGRESS NOTES
"This provider is located at [facility], Behavioral Health ,[address], [address] in [place], [place],using a secure MyChart Video Visit through [employer]. Patient is being seen remotely via telehealth at their home address in [place], and stated they are in a secure environment for this session. The patient's condition being diagnosed/treated is appropriate for telemedicine. The provider identified herself as well as her credentials.   The patient, and/or patients guardian, consent to be seen remotely, and when consent is given they understand that the consent allows for patient identifiable information to be sent to a third party as needed.   They may refuse to be seen remotely at any time. The electronic data is encrypted and password protected, and the patient and/or guardian has been advised of the potential risks to privacy not withstanding such measures.    Chief Complaint  Anxiety, Depression, and Sleeping Problem      Subjective          Dorita Shrestha presents today via MyChart Video through [employer] for medication management of her anxiety, depression, sleeping difficulties.    History of Present Illness: Patient presents today for follow-up appointment after last being seen on 02/22/2021.  At previous appointment, the patient's Lexapro was increased to 20 mg daily, and Rexulti was increased to 1 mg daily.  Patient was maintained on Vistaril 25 to 50 mg nightly.  Patient presents today's appointment and reports she is feeling \"slightly better\".  Patient had her first therapy session, but reports she does not think it went very well.  Patient thinks she needs a \"psychologist to diagnose me with what is wrong with me\".  Patient states she has a lot going on in her home life, and struggles with her relationship with her .  Patient also reports she has been struggling more when she is around other people.  Patient reports she has had some instances over the last month in which " she had to go to [facility], and almost had to leave secondary to increased anxiety from the amount of people there.  Patient reports these symptoms are causing her to stay in the house more than she knows she should.  Patient also reports she is experiencing difficulty falling asleep, but says when she uses the Vistaril it helps quite a bit with this.  Patient reports her appetite has been fluctuating between excessive and poor.  Patient denies any SI/HI, A/V hallucinations.    Current Medications:   Current Outpatient Medications   Medication Sig Dispense Refill   • escitalopram (LEXAPRO) 20 MG tablet Take 1 tablet by mouth Every Morning. 30 tablet 2   • hydrOXYzine pamoate (Vistaril) 25 MG capsule Take 1-2 capsules by mouth every night at bedtime. 60 capsule 2   • Brexpiprazole (Rexulti) 2 MG tablet Take 1 tablet by mouth Daily. 30 tablet 2     No current facility-administered medications for this visit.       Mental Status Exam:   Hygiene:   MyChart video visit, unable to determine.  Cooperation:  Guarded  Eye Contact:  Good  Psychomotor Behavior:  Slow  Affect:  Restricted  Mood: depressed  Speech:  Monotone  Thought Process:  Goal directed  Thought Content:  Mood congruent  Suicidal:  None  Homicidal:  None  Hallucinations:  None  Delusion:  None  Memory:  Intact  Orientation:  Person, Place, Time and Situation  Reliability:  good  Insight:  Fair  Judgement:  Fair  Impulse Control:  Good  Physical/Medical Issues:  No      Objective   Vital Signs:   There were no vitals taken for this visit.    Physical Exam  Neurological:      Mental Status: She is oriented to person, place, and time. Mental status is at baseline.   Psychiatric:         Behavior: Behavior is cooperative.         Thought Content: Thought content normal.         Cognition and Memory: Cognition and memory normal.         Judgment: Judgment normal.        Result Review :     The following data was reviewed by: RONIT Summers on  03/29/2021:    Data reviewed: Previous notes, therapy note, and medication history.          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 Follow-up recommendations include: Prescribed antidepressant medication treatment and Suicide Risk Assessment performed.       Tobacco Cessation:  Dorita Shrestha  reports that she has been smoking cigarettes. She has been smoking about 1.00 pack per day. She has never used smokeless tobacco.. I have educated her on the risk of diseases from using tobacco products such as cancer, COPD and heart disease.     I advised her to quit and she is not willing to quit.    I spent 3  minutes counseling the patient.      Impression/Plan:  -This is a follow-up appointment.  Patient presents today and reports she believes she may be doing a little bit better since medication creases, but is still struggling with her mood and anxiety.  Throughout the interview, the patient is very difficult to interview, as she communicates very little, and oftentimes offers little more than 2-3 word responses to questions.  The patient reports difficulty with her mood, however is unable to describe what she is feeling, or how she feels her mood is depressed.  Patient is able to describe her anxiety symptoms easily, and says they generally consist of a racing heart, and feeling lightheaded when she is in public  places.  Discussed medication options with the patient, including propranolol for her social anxiety.  Patient reports she has taken it in the past, and believes it either dropped her blood pressure or heart rate to a point where she became dizzy.  Patient reports she has taken most antidepressants, and has been on medications since she was 13 years old.  -Increase Rexulti to 2 mg daily.  New order sent.  Advised patient she can take 2 of the 1 mg tablets until they are gone, and then start the new prescription.  -Maintain Lexapro 20 mg daily.  Patient have refills.  -Maintain Vistaril 25 to 50 mg nightly.  Patient has refills.  -Discussed possibility of switching from Lexapro to a newer antidepressant, which the patient has not tried.  Possibly Trintellix or Viibryd.  Depending on outcome of next appointment, may do this then.  -Encouraged patient to keep all upcoming therapy appointments, as well as opening up with her therapist and committing to the therapeutic process.  -Schedule follow-up for 1 month or as needed.    MEDS ORDERED DURING VISIT:  New Medications Ordered This Visit   Medications   • Brexpiprazole (Rexulti) 2 MG tablet     Sig: Take 1 tablet by mouth Daily.     Dispense:  30 tablet     Refill:  2         Follow Up   Return in about 1 month (around 4/29/2021), or if symptoms worsen or fail to improve, for Next scheduled follow up.  Patient was given instructions and counseling regarding her condition or for health maintenance advice. Please see specific information pulled into the AVS if appropriate.       TREATMENT PLAN/GOALS: Continue supportive psychotherapy efforts and medications as indicated. Treatment and medication options discussed during today's visit. Patient acknowledged and verbally consented to continue with current treatment plan and was educated on the importance of compliance with treatment and follow-up appointments.    MEDICATION ISSUES:  Discussed medication options and treatment  plan of prescribed medication as well as the risks, benefits, and side effects including potential falls, possible impaired driving and metabolic adversities among others. Patient is agreeable to call the office with any worsening of symptoms or onset of side effects. Patient is agreeable to call 911 or go to the nearest ER should he/she begin having SI/HI.          This document has been electronically signed by RONIT Dash, PMHNP-BC  March 29, 2021 14:58 EDT      Part of this note may be an electronic transcription/translation of spoken language to printed text using the Dragon Dictation System.

## 2021-04-20 DIAGNOSIS — F33.2 SEVERE EPISODE OF RECURRENT MAJOR DEPRESSIVE DISORDER, WITHOUT PSYCHOTIC FEATURES (HCC): ICD-10-CM

## 2021-04-20 RX ORDER — BREXPIPRAZOLE 0.5 MG/1
TABLET ORAL
Qty: 30 TABLET | Refills: 2 | OUTPATIENT
Start: 2021-04-20

## 2021-05-20 ENCOUNTER — TELEMEDICINE (OUTPATIENT)
Dept: PSYCHIATRY | Facility: CLINIC | Age: 51
End: 2021-05-20

## 2021-05-20 DIAGNOSIS — F33.2 SEVERE EPISODE OF RECURRENT MAJOR DEPRESSIVE DISORDER, WITHOUT PSYCHOTIC FEATURES (HCC): Primary | ICD-10-CM

## 2021-05-20 PROCEDURE — 90837 PSYTX W PT 60 MINUTES: CPT | Performed by: SOCIAL WORKER

## 2021-05-25 NOTE — PROGRESS NOTES
"Date: May 25, 2021  Time In: 10:00 am   Time Out: 11:00 am       PROGRESS NOTE  Data:  Dorita Shrestha is a 50 y.o. female who presents today for individual therapy session at [facility].     Patient states that she is very stressed about life right now. She states that her son has failed the 10th grade, and she does not feel that his school helped him with online learning because her son has an IEP. Patient states that she has \"kicked out\" her  because he let her dog out and it was hit by a car. She states that her  is calling her constantly to get back in the home. Patient states she believes bad things will keep happening to her family.       Clinical Maneuvering/Intervention:    Assisted patient in processing above session content; acknowledged and normalized patient’s thoughts, feelings, and concerns.  Rationalized patient thought process regarding depression.  Discussed triggers associated with patient's depression.  Also discussed coping skills for patient to implement such as deep breathing, visualization, body scan, taking a walk.    Allowed patient to freely discuss issues without interruption or judgment. Provided safe, confidential environment to facilitate the development of positive therapeutic relationship and encourage open, honest communication. Assisted patient in identifying risk factors which would indicate the need for higher level of care including thoughts to harm self or others and/or self-harming behavior and encouraged patient to contact this office, call 911, or present to the nearest emergency room should any of these events occur. Discussed crisis intervention services and means to access. Patient adamantly and convincingly denies current suicidal or homicidal ideation or perceptual disturbance.    Assessment   Patient appears to maintain relative stability as compared to their baseline.  However, patient continues to struggle with depression which " continues to cause impairment in important areas of functioning.  A result, they can be reasonably expected to continue to benefit from treatment and would likely be at increased risk for decompensation otherwise.    Mental Status Exam:   Hygiene:   good  Cooperation:  Cooperative  Eye Contact:  Good  Psychomotor Behavior:  Appropriate  Affect:  Full range  Mood: depressed  Speech:  Normal  Thought Process:  Goal directed  Thought Content:  Normal  Suicidal:  None  Homicidal:  None  Hallucinations:  None  Delusion:  None  Memory:  Intact  Orientation:  Person, Place and Time  Reliability:  good  Insight:  Good  Judgement:  Good  Impulse Control:  Good  Physical/Medical Issues:  No          Patient's Support Network Includes:  mother    Functional Status: Moderate impairment     Progress toward goal: Not at goal    Prognosis: Fair with Ongoing Treatment          Plan     Patient will continue in individual outpatient therapy with focus on improved functioning and coping skills, maintaining stability, and avoiding decompensation and the need for higher level of care.    Patient will adhere to medication regimen as prescribed and report any side effects. Patient will contact this office, call 911 or present to the nearest emergency room should suicidal or homicidal ideations occur. Provide Cognitive Behavioral Therapy and Solution Focused Therapy to improve functioning, maintain stability, and avoid decompensation and the need for higher level of care.     Return in about 2 weeks, or earlier if symptoms worsen or fail to improve.           VISIT DIAGNOSIS:     ICD-10-CM ICD-9-CM   1. Severe episode of recurrent major depressive disorder, without psychotic features (CMS/[facility])  F33.2 296.33             This document has been electronically signed by Eunice Perera LCSW  May 25, 2021 09:55 EDT      Part of this note may be an electronic transcription/translation of spoken language to printed text using the Dragon  Dictation System.

## 2021-06-03 DIAGNOSIS — F41.1 GENERALIZED ANXIETY DISORDER: Chronic | ICD-10-CM

## 2021-06-03 DIAGNOSIS — F33.2 SEVERE EPISODE OF RECURRENT MAJOR DEPRESSIVE DISORDER, WITHOUT PSYCHOTIC FEATURES (HCC): Chronic | ICD-10-CM

## 2021-06-04 RX ORDER — ESCITALOPRAM OXALATE 20 MG/1
20 TABLET ORAL EVERY MORNING
Qty: 30 TABLET | Refills: 2 | Status: SHIPPED | OUTPATIENT
Start: 2021-06-04 | End: 2021-06-22

## 2021-06-04 RX ORDER — BREXPIPRAZOLE 2 MG/1
2 TABLET ORAL DAILY
Qty: 30 TABLET | Refills: 2 | Status: SHIPPED | OUTPATIENT
Start: 2021-06-04 | End: 2021-08-31 | Stop reason: SDUPTHER

## 2021-06-22 ENCOUNTER — TELEMEDICINE (OUTPATIENT)
Dept: PSYCHIATRY | Facility: CLINIC | Age: 51
End: 2021-06-22

## 2021-06-22 DIAGNOSIS — F41.1 GENERALIZED ANXIETY DISORDER: Chronic | ICD-10-CM

## 2021-06-22 DIAGNOSIS — F33.2 SEVERE EPISODE OF RECURRENT MAJOR DEPRESSIVE DISORDER, WITHOUT PSYCHOTIC FEATURES (HCC): Primary | Chronic | ICD-10-CM

## 2021-06-22 DIAGNOSIS — F51.05 INSOMNIA DUE TO MENTAL DISORDER: Chronic | ICD-10-CM

## 2021-06-22 PROCEDURE — 99214 OFFICE O/P EST MOD 30 MIN: CPT | Performed by: NURSE PRACTITIONER

## 2021-06-22 RX ORDER — MELOXICAM 15 MG/1
15 TABLET ORAL
COMMUNITY
Start: 2021-05-25

## 2021-06-22 RX ORDER — VORTIOXETINE 10 MG/1
10 TABLET, FILM COATED ORAL DAILY
Qty: 30 TABLET | Refills: 0 | Status: SHIPPED | OUTPATIENT
Start: 2021-06-22 | End: 2021-08-03 | Stop reason: SDUPTHER

## 2021-07-18 NOTE — PROGRESS NOTES
"This provider is located at [facility], Behavioral [facility] ,[address], [address] in [place], [place],using a secure MyChart Video Visit through [employer]. Patient is being seen remotely via telehealth at their home address in [place], and stated they are in a secure environment for this session. The patient's condition being diagnosed/treated is appropriate for telemedicine. The provider identified herself as well as her credentials.   The patient, and/or patients guardian, consent to be seen remotely, and when consent is given they understand that the consent allows for patient identifiable information to be sent to a third party as needed.   They may refuse to be seen remotely at any time. The electronic data is encrypted and password protected, and the patient and/or guardian has been advised of the potential risks to privacy not withstanding such measures.    Chief Complaint  Anxiety, Depression, and Sleeping Problem      Subjective          Dorita Shrestha presents today via MyChart Video through [employer] for medication management of her anxiety, depression, and sleeping difficulties.    History of Present Illness: Patient presents today for follow-up appointment after last being seen on 03/29/2021.  Patient reports \"I am doing a little better I think\".  Patient reports she has not been doing too much, but says she is getting out of the house more.  She reports she recently went with her family to [place], and had a good time.  Patient feels as though her anxiety has been worse.  She endorses being worried about the other people in her family's health and life issues.  Patient says her father \"had a couple of episodes.  He burst a blood vessel in his stoma and almost bled to death twice in 1 week\".  Patient also worries about her sister with breast cancer, and says her son did not pass the 10th grade this year, and will have to repeat the entire year.  Patient reports she is " not been sleeping particularly well, but has also not been using her Vistaril secondary to feeling hung over too frequently the next day.  Patient endorses excessive eating several days a week, but says appetite has been good for the most part.  Patient denies any SI/HI, A/V hallucinations.    Current Medications:   Current Outpatient Medications   Medication Sig Dispense Refill   • Brexpiprazole (Rexulti) 2 MG tablet Take 1 tablet by mouth Daily. 30 tablet 2   • hydrOXYzine pamoate (Vistaril) 25 MG capsule Take 1-2 capsules by mouth every night at bedtime. 60 capsule 2   • meloxicam (MOBIC) 15 MG tablet Take 15 mg by mouth every night at bedtime.     • Vortioxetine HBr (Trintellix) 10 MG tablet Take 10 mg by mouth Daily. START ON 06/30, AFTER COMPLETING PREVIOUS TAPER 30 tablet 0     No current facility-administered medications for this visit.        Mental Status Exam:   Hygiene:   MyChart video visit, unable to determine.  Cooperation:  Cooperative  Eye Contact:  Poor  Psychomotor Behavior:  Slow  Affect:  Restricted  Mood: depressed  Speech:  Monotone  Thought Process:  Goal directed  Thought Content:  Normal and Mood congruent  Suicidal:  None  Homicidal:  None  Hallucinations:  None  Delusion:  None  Memory:  Intact  Orientation:  Person, Place, Time and Situation  Reliability:  fair  Insight:  Fair  Judgement:  Good  Impulse Control:  Good  Physical/Medical Issues:  No      Objective   Vital Signs:   There were no vitals taken for this visit.    Physical Exam  Neurological:      Mental Status: She is oriented to person, place, and time. Mental status is at baseline.   Psychiatric:         Behavior: Behavior is slowed. Behavior is cooperative.         Thought Content: Thought content does not include homicidal or suicidal ideation. Thought content does not include homicidal or suicidal plan.         Cognition and Memory: Cognition and memory normal.         Judgment: Judgment normal.        Result Review :          The following data was reviewed by: RONIT Summers on 06/22/2021:    Data reviewed: Previous notes, and medication history.          Assessment and Plan    Problem List Items Addressed This Visit     None      Visit Diagnoses     Severe episode of recurrent major depressive disorder, without psychotic features (CMS/HCC)  (Chronic)   -  Primary    Relevant Medications    Vortioxetine HBr (Trintellix) 10 MG tablet    Generalized anxiety disorder  (Chronic)       Relevant Medications    Vortioxetine HBr (Trintellix) 10 MG tablet    Insomnia due to mental disorder  (Chronic)       Relevant Medications    Vortioxetine HBr (Trintellix) 10 MG tablet          PHQ-9 Score:   PHQ-9 Total Score: 18    Depression Screening:  Patient screened positive for depression based on a PHQ-9 score of 18 on 6/22/2021. Follow-up recommendations include: Prescribed antidepressant medication treatment and Suicide Risk Assessment performed.       Tobacco Cessation:  Dorita Shrestha  reports that she has been smoking cigarettes. She has been smoking about 1.00 pack per day. She has never used smokeless tobacco.. I have educated her on the risk of diseases from using tobacco products such as cancer, COPD and heart disease.     I advised her to quit and she is not willing to quit.    I spent 3  minutes counseling the patient.       Impression/Plan:  -This is a follow-up appointment.  Patient is in today with complaints of continued mood issues, but primarily complains of continued issues with anxiety.  Patient has been on current medication regimen for several months, with limited perceived efficacy related to her anxiety.  She also reports she is not taking the Vistaril very often secondary to feeling too groggy the next day when she has taken it.  Patient reports she has been taking it at around 10 to 10:30 PM.  Advised the patient this was too late to take the medication, and recommended she try taking it again, but take it between  8 and 9 PM.  -Maintain Rexulti 2 mg daily.  Patient has refills.  -Maintain Vistaril 25 to 50 mg nightly as needed.  Patient has refills.  -Discontinue Lexapro 20 mg daily via 7-day 10 mg taper, then stop.  -Start Trintellix 10 mg daily on 06/30/2021, after completing Lexapro taper.  -Schedule follow-up for 6 weeks or as needed.    MEDS ORDERED DURING VISIT:  New Medications Ordered This Visit   Medications   • Vortioxetine HBr (Trintellix) 10 MG tablet     Sig: Take 10 mg by mouth Daily. START ON 06/30, AFTER COMPLETING PREVIOUS TAPER     Dispense:  30 tablet     Refill:  0         Follow Up   Return in about 6 weeks (around 8/3/2021), or if symptoms worsen or fail to improve, for Next scheduled follow up, Video visit.  Patient was given instructions and counseling regarding her condition or for health maintenance advice. Please see specific information pulled into the AVS if appropriate.       TREATMENT PLAN/GOALS: Continue supportive psychotherapy efforts and medications as indicated. Treatment and medication options discussed during today's visit. Patient acknowledged and verbally consented to continue with current treatment plan and was educated on the importance of compliance with treatment and follow-up appointments.    MEDICATION ISSUES:  Discussed medication options and treatment plan of prescribed medication as well as the risks, benefits, and side effects including potential falls, possible impaired driving and metabolic adversities among others. Patient is agreeable to call the office with any worsening of symptoms or onset of side effects. Patient is agreeable to call 911 or go to the nearest ER should he/she begin having SI/HI.          This document has been electronically signed by RONIT Dash, PMHNP-BC  June 22, 2021 14:27 EDT      Part of this note may be an electronic transcription/translation of spoken language to printed text using the Dragon Dictation System.   Pt is a 89 year old Female, PMH HTN, mod MR/TR, diastolic CHF, CAD, TIA, PAD, breast CA, valvular disease(aortic and mitral) who has been experiencing worsening SOB. Now s/p TF-TAVR via [facility] 7/14 with Dr. Jimenez. Postop course notable for new RBBB, now resolved.  On 7/17 late pm pt transferred back to ICU with c/o abdominal pain and distended abdomen. Pt found to have stool impaction and developing stercoral colitis. Patient reports feeling much better this morning. Has been continuing to have liquid bowel movements and passing gas.   Pt reports having decreased appetite/PO intake over the past few years, endorses weight loss; however unable to quantify. Pt reports eating small portions at home. Pt is now tolerating PO diet. Brief NFPE conducted.

## 2021-08-03 ENCOUNTER — TELEMEDICINE (OUTPATIENT)
Dept: PSYCHIATRY | Facility: CLINIC | Age: 51
End: 2021-08-03

## 2021-08-03 DIAGNOSIS — F41.1 GENERALIZED ANXIETY DISORDER: Chronic | ICD-10-CM

## 2021-08-03 DIAGNOSIS — F33.2 SEVERE EPISODE OF RECURRENT MAJOR DEPRESSIVE DISORDER, WITHOUT PSYCHOTIC FEATURES (HCC): Primary | Chronic | ICD-10-CM

## 2021-08-03 PROCEDURE — 99214 OFFICE O/P EST MOD 30 MIN: CPT | Performed by: NURSE PRACTITIONER

## 2021-08-03 RX ORDER — VORTIOXETINE 10 MG/1
10 TABLET, FILM COATED ORAL DAILY
Qty: 30 TABLET | Refills: 2 | Status: SHIPPED | OUTPATIENT
Start: 2021-08-03

## 2021-08-03 NOTE — PROGRESS NOTES
"This provider is located at [facility], Behavioral Health ,[address], [address] in [place], [place],using a secure MyChart Video Visit through [employer]. Patient is being seen remotely via telehealth at their home address in [place], and stated they are in a secure environment for this session. The patient's condition being diagnosed/treated is appropriate for telemedicine. The provider identified herself as well as her credentials.   The patient, and/or patients guardian, consent to be seen remotely, and when consent is given they understand that the consent allows for patient identifiable information to be sent to a third party as needed.   They may refuse to be seen remotely at any time. The electronic data is encrypted and password protected, and the patient and/or guardian has been advised of the potential risks to privacy not withstanding such measures.    Chief Complaint  Anxiety, Depression, and Sleeping Problem      Subjective          Dorita Shrestha presents today via MyChart Video through [employer] for medication management of her anxiety, depression, and sleeping difficulties.    History of Present Illness: Patient presents today for follow-up appointment after last being seen on 06/22/2021.  Patient reports \"I am doing about the same\".  Patient reports she continues to have issues with low mood and anxiety.  She reports she has been having car trouble difficulty, and does not currently have reliable transportation.  She also reports she has been getting ready for the school year to start.  Her son was supposed to be in 11th grade this year, but \"he did not do anything last year, so I guess he will have to repeat 10th grade\".  Patient reports her sleep has remained consistent around 7 hours per night.  She often wakes up 1-2 times throughout the night but is able to go back to sleep.  Patient reports she does not take her hydroxyzine on a nightly basis, only when she needs it.  " Patient reports her appetite has been excessive, and says she feels as though she has been emotionally eating.  Patient is not leaving the house very often, only going to the store, and occasionally to visit family.  Patient reports her father has gotten worse, and his colon cancer has now metastasized into his pancreas.  She reports her sister still has cancer as well.  Patient endorses a lot of external stressors which heavily, and negatively impact her mood.  Patient denies any SI/HI, A/V hallucinations.    Current Medications:   Current Outpatient Medications   Medication Sig Dispense Refill   • Brexpiprazole (Rexulti) 2 MG tablet Take 1 tablet by mouth Daily. 30 tablet 2   • hydrOXYzine pamoate (Vistaril) 25 MG capsule Take 1-2 capsules by mouth every night at bedtime. 60 capsule 2   • meloxicam (MOBIC) 15 MG tablet Take 15 mg by mouth every night at bedtime.     • Vortioxetine HBr (Trintellix) 10 MG tablet Take 10 mg by mouth Daily. 30 tablet 2     No current facility-administered medications for this visit.       Mental Status Exam:   Hygiene:   MyChart video  Cooperation:  Cooperative  Eye Contact:  Fair  Psychomotor Behavior:  Slow  Affect:  Tearful  Mood: depressed  Speech:  Monotone  Thought Process:  Linear  Thought Content:  Mood congruent  Suicidal:  None  Homicidal:  None  Hallucinations:  None  Delusion:  None  Memory:  Intact  Orientation:  Person, Place, Time and Situation  Reliability:  fair  Insight:  Fair  Judgement:  Fair  Impulse Control:  Good  Physical/Medical Issues:  No      Objective   Vital Signs:   There were no vitals taken for this visit.    Physical Exam  Neurological:      Mental Status: She is oriented to person, place, and time. Mental status is at baseline.   Psychiatric:         Behavior: Behavior is cooperative.         Thought Content: Thought content normal.         Cognition and Memory: Cognition and memory normal.         Judgment: Judgment normal.        Result Review :  "    The following data was reviewed by: RONIT Summers on 08/03/2021:    Data reviewed: Previous note, therapy note, medication history          Assessment and Plan    Problem List Items Addressed This Visit     None      Visit Diagnoses     Severe episode of recurrent major depressive disorder, without psychotic features (CMS/HCC)  (Chronic)   -  Primary    Relevant Medications    Vortioxetine HBr (Trintellix) 10 MG tablet    Generalized anxiety disorder  (Chronic)       Relevant Medications    Vortioxetine HBr (Trintellix) 10 MG tablet          PHQ-9 Score:   PHQ-9 Total Score: 15    Depression Screening:  Patient screened positive for depression based on a PHQ-9 score of 15 on 8/3/2021. Follow-up recommendations include: Prescribed antidepressant medication treatment and Suicide Risk Assessment performed.       Tobacco Cessation:  Dorita Shrestha  reports that she has been smoking cigarettes. She has been smoking about 1.00 pack per day. She has never used smokeless tobacco.. I have educated her on the risk of diseases from using tobacco products such as cancer, COPD and heart disease.     I advised her to quit and she is not willing to quit.    I spent 3  minutes counseling the patient.      Impression/Plan:  -This follow-up appointment.  Patient presents today and reports he feels as though her mood has remained static over the last month.  Her PHQ-9 score is slightly improved, dropping from 18 last appointment to 15 today.  However, patient continues to complain of very low mood and heightened anxiety.  Patient is not currently in therapy.  She has not been seen since May.  Patient reports she is open to continuing that process.  Encouraged the patient to get back into therapy, and discussed steps she can take for helping to improve her own mental health.  Patient appears to be \"stuck\" where she is.  Patient endorses a lot of financial, emotional, and physical issues, which all contribute to her low " mood and mental health concerns.  -Maintain Rexulti 2 mg daily.  -Maintain Trintellix 10 mg daily.  -Maintain Vistaril 25 to 50 mg nightly.  -Have patient rescheduled for therapy with Eunice Dixon LCSW.  -Schedule follow-up for 1 month or as needed.    MEDS ORDERED DURING VISIT:  New Medications Ordered This Visit   Medications   • Vortioxetine HBr (Trintellix) 10 MG tablet     Sig: Take 10 mg by mouth Daily.     Dispense:  30 tablet     Refill:  2         Follow Up   Return in about 1 month (around 9/3/2021), or if symptoms worsen or fail to improve, for Next scheduled follow up, Video visit.  Patient was given instructions and counseling regarding her condition or for health maintenance advice. Please see specific information pulled into the AVS if appropriate.       TREATMENT PLAN/GOALS: Continue supportive psychotherapy efforts and medications as indicated. Treatment and medication options discussed during today's visit. Patient acknowledged and verbally consented to continue with current treatment plan and was educated on the importance of compliance with treatment and follow-up appointments.    MEDICATION ISSUES:  Discussed medication options and treatment plan of prescribed medication as well as the risks, benefits, and side effects including potential falls, possible impaired driving and metabolic adversities among others. Patient is agreeable to call the office with any worsening of symptoms or onset of side effects. Patient is agreeable to call 911 or go to the nearest ER should he/she begin having SI/HI.          This document has been electronically signed by RONIT Dash, PMHNP-BC  August 3, 2021 14:55 EDT      Part of this note may be an electronic transcription/translation of spoken language to printed text using the Dragon Dictation System.

## 2021-08-31 ENCOUNTER — TELEMEDICINE (OUTPATIENT)
Dept: PSYCHIATRY | Facility: CLINIC | Age: 51
End: 2021-08-31

## 2021-08-31 DIAGNOSIS — F51.05 INSOMNIA DUE TO MENTAL DISORDER: Chronic | ICD-10-CM

## 2021-08-31 DIAGNOSIS — F41.1 GENERALIZED ANXIETY DISORDER: Chronic | ICD-10-CM

## 2021-08-31 DIAGNOSIS — F33.2 SEVERE EPISODE OF RECURRENT MAJOR DEPRESSIVE DISORDER, WITHOUT PSYCHOTIC FEATURES (HCC): Primary | Chronic | ICD-10-CM

## 2021-08-31 PROCEDURE — 99214 OFFICE O/P EST MOD 30 MIN: CPT | Performed by: NURSE PRACTITIONER

## 2021-08-31 RX ORDER — BREXPIPRAZOLE 2 MG/1
2 TABLET ORAL DAILY
Qty: 30 TABLET | Refills: 2 | Status: SHIPPED | OUTPATIENT
Start: 2021-08-31

## 2021-09-15 ENCOUNTER — TELEMEDICINE (OUTPATIENT)
Dept: PSYCHIATRY | Facility: CLINIC | Age: 51
End: 2021-09-15

## 2021-09-15 DIAGNOSIS — F33.2 SEVERE EPISODE OF RECURRENT MAJOR DEPRESSIVE DISORDER, WITHOUT PSYCHOTIC FEATURES (HCC): Primary | ICD-10-CM

## 2021-09-15 PROCEDURE — 90832 PSYTX W PT 30 MINUTES: CPT | Performed by: SOCIAL WORKER

## 2021-10-04 NOTE — PROGRESS NOTES
Date: September 15, 2021   Time In: 10:00 am   Time Out: 10:30 am       PROGRESS NOTE  Data:  Dorita Shrestha is a 50 y.o. female who presents today for individual therapy session at [facility].     Patient tells me that she continues to feel depressed, and has for some time. She tells me that she does not believe that she will improve. She tells me that her father has recently passed away, and she has been grieving his death. Patient states that her father  on  and she was able to engage in planning his . This therapist attempted to discuss coping skills and self care with patient. Patient requests to end session at 30 minutes early, and states she needed to leave.       Clinical Maneuvering/Intervention:    Assisted patient in processing above session content; acknowledged and normalized patient’s thoughts, feelings, and concerns.  Rationalized patient thought process regarding depression.  Discussed triggers associated with patient's depression.  Also discussed coping skills for patient to implement such as deep breathing, visualization, body scan, taking a walk.    Allowed patient to freely discuss issues without interruption or judgment. Provided safe, confidential environment to facilitate the development of positive therapeutic relationship and encourage open, honest communication. Assisted patient in identifying risk factors which would indicate the need for higher level of care including thoughts to harm self or others and/or self-harming behavior and encouraged patient to contact this office, call 911, or present to the nearest emergency room should any of these events occur. Discussed crisis intervention services and means to access. Patient adamantly and convincingly denies current suicidal or homicidal ideation or perceptual disturbance.    Assessment   Patient appears to maintain relative stability as compared to their baseline.  However, patient continues to  struggle with depression which continues to cause impairment in important areas of functioning.  A result, they can be reasonably expected to continue to benefit from treatment and would likely be at increased risk for decompensation otherwise.    Mental Status Exam:   Hygiene:   good  Cooperation:  Cooperative  Eye Contact:  Good  Psychomotor Behavior:  Appropriate  Affect:  Full range  Mood: depressed  Speech:  Normal  Thought Process:  Goal directed  Thought Content:  Normal  Suicidal:  None  Homicidal:  None  Hallucinations:  None  Delusion:  None  Memory:  Intact  Orientation:  Person, Place and Time  Reliability:  good  Insight:  Good  Judgement:  Good  Impulse Control:  Good  Physical/Medical Issues:  No          Patient's Support Network Includes:  mother    Functional Status: Moderate impairment     Progress toward goal: Not at goal    Prognosis: Fair with Ongoing Treatment          Plan     Patient will continue in individual outpatient therapy with focus on improved functioning and coping skills, maintaining stability, and avoiding decompensation and the need for higher level of care.    Patient will adhere to medication regimen as prescribed and report any side effects. Patient will contact this office, call 911 or present to the nearest emergency room should suicidal or homicidal ideations occur. Provide Cognitive Behavioral Therapy and Solution Focused Therapy to improve functioning, maintain stability, and avoid decompensation and the need for higher level of care.     Return in about 2 weeks, or earlier if symptoms worsen or fail to improve.           VISIT DIAGNOSIS:     ICD-10-CM ICD-9-CM   1. Severe episode of recurrent major depressive disorder, without psychotic features ([facility])  F33.2 296.33             This document has been electronically signed by Eunice Perera LCSW  October 3, 2021 21:36 EDT      Part of this note may be an electronic transcription/translation of spoken language to  printed text using the Dragon Dictation System.

## 2022-02-27 DIAGNOSIS — F33.2 SEVERE EPISODE OF RECURRENT MAJOR DEPRESSIVE DISORDER, WITHOUT PSYCHOTIC FEATURES: Chronic | ICD-10-CM

## 2022-02-27 DIAGNOSIS — F41.1 GENERALIZED ANXIETY DISORDER: Chronic | ICD-10-CM

## 2022-02-28 RX ORDER — ESCITALOPRAM OXALATE 20 MG/1
20 TABLET ORAL EVERY MORNING
Qty: 30 TABLET | Refills: 2 | OUTPATIENT
Start: 2022-02-28

## 2022-03-24 ENCOUNTER — APPOINTMENT (OUTPATIENT)
Dept: CT IMAGING | Facility: HOSPITAL | Age: 52
End: 2022-03-24

## 2022-03-24 ENCOUNTER — HOSPITAL ENCOUNTER (EMERGENCY)
Facility: HOSPITAL | Age: 52
Discharge: HOME | End: 2022-03-24
Attending: PHYSICIAN ASSISTANT | Admitting: EMERGENCY MEDICINE

## 2022-03-24 VITALS
HEART RATE: 81 BPM | HEIGHT: 59 IN | DIASTOLIC BLOOD PRESSURE: 79 MMHG | RESPIRATION RATE: 16 BRPM | SYSTOLIC BLOOD PRESSURE: 111 MMHG | WEIGHT: 205 LBS | OXYGEN SATURATION: 100 % | TEMPERATURE: 98 F | BODY MASS INDEX: 41.33 KG/M2

## 2022-03-24 DIAGNOSIS — K59.00 CONSTIPATION, UNSPECIFIED CONSTIPATION TYPE: ICD-10-CM

## 2022-03-24 DIAGNOSIS — N30.00 ACUTE CYSTITIS WITHOUT HEMATURIA: ICD-10-CM

## 2022-03-24 DIAGNOSIS — K64.9 HEMORRHOIDS, UNSPECIFIED HEMORRHOID TYPE: Primary | ICD-10-CM

## 2022-03-24 LAB
ALBUMIN SERPL-MCNC: 4.4 G/DL (ref 3.5–5.2)
ALBUMIN/GLOB SERPL: 1.3 G/DL
ALP SERPL-CCNC: 67 U/L (ref 39–117)
ALT SERPL W P-5'-P-CCNC: 11 U/L (ref 1–33)
ANION GAP SERPL CALCULATED.3IONS-SCNC: 13.6 MMOL/L (ref 5–15)
AST SERPL-CCNC: 12 U/L (ref 1–32)
BACTERIA UR QL AUTO: ABNORMAL /HPF
BASOPHILS # BLD AUTO: 0.08 10*3/MM3 (ref 0–0.2)
BASOPHILS NFR BLD AUTO: 0.9 % (ref 0–1.5)
BILIRUB SERPL-MCNC: 0.5 MG/DL (ref 0–1.2)
BILIRUB UR QL STRIP: NEGATIVE
BUN SERPL-MCNC: 10 MG/DL (ref 6–20)
BUN/CREAT SERPL: 15.9 (ref 7–25)
CALCIUM SPEC-SCNC: 9.9 MG/DL (ref 8.6–10.5)
CHLORIDE SERPL-SCNC: 101 MMOL/L (ref 98–107)
CLARITY UR: ABNORMAL
CO2 SERPL-SCNC: 24.4 MMOL/L (ref 22–29)
COLOR UR: YELLOW
CREAT SERPL-MCNC: 0.63 MG/DL (ref 0.57–1)
DEPRECATED RDW RBC AUTO: 45.5 FL (ref 37–54)
EGFRCR SERPLBLD CKD-EPI 2021: 108.9 ML/MIN/1.73
EOSINOPHIL # BLD AUTO: 0.34 10*3/MM3 (ref 0–0.4)
EOSINOPHIL NFR BLD AUTO: 3.8 % (ref 0.3–6.2)
ERYTHROCYTE [DISTWIDTH] IN BLOOD BY AUTOMATED COUNT: 13.2 % (ref 12.3–15.4)
GLOBULIN UR ELPH-MCNC: 3.5 GM/DL
GLUCOSE SERPL-MCNC: 105 MG/DL (ref 65–99)
GLUCOSE UR STRIP-MCNC: NEGATIVE MG/DL
HCT VFR BLD AUTO: 42 % (ref 34–46.6)
HGB BLD-MCNC: 13.9 G/DL (ref 12–15.9)
HGB UR QL STRIP.AUTO: NEGATIVE
HYALINE CASTS UR QL AUTO: ABNORMAL /LPF
IMM GRANULOCYTES # BLD AUTO: 0.05 10*3/MM3 (ref 0–0.05)
IMM GRANULOCYTES NFR BLD AUTO: 0.6 % (ref 0–0.5)
KETONES UR QL STRIP: NEGATIVE
LEUKOCYTE ESTERASE UR QL STRIP.AUTO: ABNORMAL
LYMPHOCYTES # BLD AUTO: 2.46 10*3/MM3 (ref 0.7–3.1)
LYMPHOCYTES NFR BLD AUTO: 27.5 % (ref 19.6–45.3)
MCH RBC QN AUTO: 30.9 PG (ref 26.6–33)
MCHC RBC AUTO-ENTMCNC: 33.1 G/DL (ref 31.5–35.7)
MCV RBC AUTO: 93.3 FL (ref 79–97)
MONOCYTES # BLD AUTO: 0.59 10*3/MM3 (ref 0.1–0.9)
MONOCYTES NFR BLD AUTO: 6.6 % (ref 5–12)
NEUTROPHILS NFR BLD AUTO: 5.43 10*3/MM3 (ref 1.7–7)
NEUTROPHILS NFR BLD AUTO: 60.6 % (ref 42.7–76)
NITRITE UR QL STRIP: NEGATIVE
NRBC BLD AUTO-RTO: 0 /100 WBC (ref 0–0.2)
PH UR STRIP.AUTO: <=5 [PH] (ref 5–8)
PLATELET # BLD AUTO: 279 10*3/MM3 (ref 140–450)
PMV BLD AUTO: 10.2 FL (ref 6–12)
POTASSIUM SERPL-SCNC: 3.8 MMOL/L (ref 3.5–5.2)
PROT SERPL-MCNC: 7.9 G/DL (ref 6–8.5)
PROT UR QL STRIP: NEGATIVE
RBC # BLD AUTO: 4.5 10*6/MM3 (ref 3.77–5.28)
RBC # UR STRIP: ABNORMAL /HPF
REF LAB TEST METHOD: ABNORMAL
SODIUM SERPL-SCNC: 139 MMOL/L (ref 136–145)
SP GR UR STRIP: 1.01 (ref 1–1.03)
SQUAMOUS #/AREA URNS HPF: ABNORMAL /HPF
UROBILINOGEN UR QL STRIP: ABNORMAL
WBC # UR STRIP: ABNORMAL /HPF
WBC NRBC COR # BLD: 8.95 10*3/MM3 (ref 3.4–10.8)

## 2022-03-24 PROCEDURE — 99283 EMERGENCY DEPT VISIT LOW MDM: CPT

## 2022-03-24 PROCEDURE — 96374 THER/PROPH/DIAG INJ IV PUSH: CPT

## 2022-03-24 PROCEDURE — 85025 COMPLETE CBC W/AUTO DIFF WBC: CPT | Performed by: PHYSICIAN ASSISTANT

## 2022-03-24 PROCEDURE — 74177 CT ABD & PELVIS W/CONTRAST: CPT

## 2022-03-24 PROCEDURE — 80053 COMPREHEN METABOLIC PANEL: CPT | Performed by: PHYSICIAN ASSISTANT

## 2022-03-24 PROCEDURE — 81001 URINALYSIS AUTO W/SCOPE: CPT | Performed by: PHYSICIAN ASSISTANT

## 2022-03-24 PROCEDURE — 25010000002 IOPAMIDOL 61 % SOLUTION: Performed by: EMERGENCY MEDICINE

## 2022-03-24 PROCEDURE — 25010000002 ONDANSETRON PER 1 MG: Performed by: PHYSICIAN ASSISTANT

## 2022-03-24 RX ORDER — SODIUM CHLORIDE 0.9 % (FLUSH) 0.9 %
10 SYRINGE (ML) INJECTION AS NEEDED
Status: DISCONTINUED | OUTPATIENT
Start: 2022-03-24 | End: 2022-03-24 | Stop reason: HOSPADM

## 2022-03-24 RX ORDER — ONDANSETRON 2 MG/ML
4 INJECTION INTRAMUSCULAR; INTRAVENOUS ONCE
Status: COMPLETED | OUTPATIENT
Start: 2022-03-24 | End: 2022-03-24

## 2022-03-24 RX ORDER — GRANULES FOR ORAL 3 G/1
1 POWDER ORAL ONCE
Status: DISCONTINUED | OUTPATIENT
Start: 2022-03-24 | End: 2022-03-24 | Stop reason: HOSPADM

## 2022-03-24 RX ORDER — ONDANSETRON 4 MG/1
4 TABLET, ORALLY DISINTEGRATING ORAL ONCE
Status: DISCONTINUED | OUTPATIENT
Start: 2022-03-24 | End: 2022-03-24

## 2022-03-24 RX ORDER — COCOA BUTTER, PHENYLEPHRINE HYDROCHLORIDE 2211; 6.25 MG/1; MG/1
1 SUPPOSITORY RECTAL AS NEEDED
Qty: 30 SUPPOSITORY | Refills: 0 | Status: CANCELLED | OUTPATIENT
Start: 2022-03-24

## 2022-03-24 RX ADMIN — IOPAMIDOL 100 ML: 612 INJECTION, SOLUTION INTRAVENOUS at 13:45

## 2022-03-24 RX ADMIN — ONDANSETRON 4 MG: 2 INJECTION INTRAMUSCULAR; INTRAVENOUS at 13:39

## 2022-03-24 NOTE — ED NOTES
"Asked by primary RN Francy to discharge pt. Pt refuses fosfomycin. Pt states \"I have antibiotics at home, I ain't got no urinary tract infection\" discussed pt's UA results, and discussed dangers of taking old antibiotics including antibiotic resistance, and effects of improperly treated UTI. Pt continues to refuse, demands IV out and wishes to leave. Pt refuses dc vitals. Discussed with primary RN.  "

## 2022-03-24 NOTE — DISCHARGE INSTRUCTIONS
You have been diagnosed with hemorrhoids and constipation.  A prescription for Preparation H suppositories were sent to your pharmacy.  Please use as directed.  You may also do sitz bat for relief.  Add in fiber and MiraLAX into your daily routine to help regulate bowel movements.  Do not strain bowel movements.  Contact the office of Dr. Sorto to schedule follow-up appoint with gastroenterology.    Your urine showed signs of infection.  You are given 1 dose of fosfomycin here in the ED for treatment.  Urine will be sent for culture.    Return to ER as needed.

## 2022-03-24 NOTE — ED PROVIDER NOTES
Subjective   Patient is a 51-year-old female  with PMH of prior rectal surgery due to necrotic tissue with complication of anal fissure afterwards who presents today with rectal pain.  She reports she has been having pain over the past 3 weeks.  It is constant and preventing her from having a bowel movement.  She states that about 1 week after the pain started she noticed a developing abscess on her rectum.  This lasted for about 1 week.  She was evaluated by her primary care provider who advised it was a hemorrhoid.  She noticed that it popped last week and had a significant amount of drainage.  She continues to have rectal pain and drainage from the area.  She states she has not had a bowel movement in 2 weeks.  She has developed abdominal pain over the past 2 days.  Is primarily located in the bilateral lower quadrants.  She reports having a fever of 101 on  but has not had a temperature since.  She has been taking ibuprofen for the pain.  She has had decreased oral intake but is tolerating food without nausea or vomiting.      History provided by:  Parent   used: No        Review of Systems   Gastrointestinal: Positive for abdominal pain, anal bleeding, constipation and rectal pain. Negative for abdominal distention, blood in stool, diarrhea, nausea and vomiting.   All other systems reviewed and are negative.      Past Medical History:   Diagnosis Date   • Arthritis    • Fibromyalgia, primary    • Headache    • Infectious viral hepatitis    • Kidney stone    • Substance abuse (HCC)    • Suicide attempt (HCC)        No Known Allergies    Past Surgical History:   Procedure Laterality Date   • ANAL FISSURECTOMY     •  SECTION     • CHOLECYSTECTOMY     • KIDNEY STONE SURGERY         Family History   Problem Relation Age of Onset   • Arthritis Mother    • Depression Mother    • Arthritis Father    • Thyroid disease Father    • Migraines Sister    • Anxiety disorder Sister    •  Depression Sister    • Cancer Maternal Grandmother    • ADD / ADHD Son    • OCD Son    • Anxiety disorder Sister    • Depression Sister    • Self-Injurious Behavior  Daughter    • Suicide Attempts Daughter    • Alcohol abuse Neg Hx    • Bipolar disorder Neg Hx    • Dementia Neg Hx    • Drug abuse Neg Hx    • Paranoid behavior Neg Hx    • Schizophrenia Neg Hx    • Seizures Neg Hx        Social History     Socioeconomic History   • Marital status: Legally    Tobacco Use   • Smoking status: Current Every Day Smoker     Packs/day: 1.00     Types: Cigarettes   • Smokeless tobacco: Never Used   Vaping Use   • Vaping Use: Never used   Substance and Sexual Activity   • Alcohol use: No   • Drug use: Not Currently   • Sexual activity: Defer           Objective   Physical Exam  Constitutional:       Appearance: Normal appearance. She is obese.   HENT:      Head: Normocephalic.      Mouth/Throat:      Mouth: Mucous membranes are moist.   Eyes:      Extraocular Movements: Extraocular movements intact.      Conjunctiva/sclera: Conjunctivae normal.      Pupils: Pupils are equal, round, and reactive to light.   Cardiovascular:      Rate and Rhythm: Normal rate and regular rhythm.      Pulses: Normal pulses.      Heart sounds: Normal heart sounds.   Pulmonary:      Effort: Pulmonary effort is normal.      Breath sounds: Normal breath sounds.   Abdominal:      General: There is no distension.      Palpations: Abdomen is soft. There is no mass.      Tenderness: There is abdominal tenderness. There is no guarding or rebound.      Hernia: No hernia is present.   Genitourinary:     Comments: Multiple external hemorrhoids visualized around anus.  Not thrombosed.  No fissures.  No gross blood.  Digital rectal exam not performed due to patient's discomfort.  Nurse present as chaperone.  Musculoskeletal:         General: No deformity.   Skin:     General: Skin is warm and dry.   Neurological:      General: No focal deficit present.       Mental Status: She is alert and oriented to person, place, and time.         Procedures           ED Course                                                 MDM  Number of Diagnoses or Management Options  Acute cystitis without hematuria  Constipation, unspecified constipation type  Hemorrhoids, unspecified hemorrhoid type  Diagnosis management comments: Patient is a 51-year-old female who presents with rectal pain for the past 3 weeks and constipation for 2 weeks.  She had an episode where she felt something burst around her rectum with fluid output.  She was evaluated by her primary care and was advised that she does have hemorrhoids.  She has been referred to GI for follow-up but has not yet establish care.  Patient is afebrile, hemodynamically stable, not in acute distress.  Physical exam does show diffuse abdominal tenderness with localization lower quadrant.  Differential includes hemorrhoids, perirectal abscess, obstruction, constipation.  Patient given Zofran for nausea.  Urinalysis returned with trace bacteria and leukoesterase and cloudy appearance.  Given 1 dose of fosfomycin.  CT of the abdomen and pelvis showed no acute intra-abdominal process.  Patient symptoms are consistent with hemorrhoids.  Advised dietary modifications and given prescription of Preparation H.  Given information for Dr. Sorto to schedule a follow-up appoint with gastroenterology.  Discussed results and plan with patient who verbalized understanding.       Amount and/or Complexity of Data Reviewed  Clinical lab tests: reviewed and ordered  Tests in the radiology section of CPT®: ordered and reviewed  Tests in the medicine section of CPT®: ordered and reviewed  Discussion of test results with the performing providers: yes        Final diagnoses:   Constipation, unspecified constipation type   Hemorrhoids, unspecified hemorrhoid type   Acute cystitis without hematuria       ED Disposition  ED Disposition     ED Disposition    Discharge    Condition   Stable    Comment   --             Chari Sorto MD  [address] [facility] [place] [address]  [facility] [id number] [phone]    Schedule an appointment as soon as possible for a visit today           Medication List      No changes were made to your prescriptions during this visit.          Tate Burns, PAFarhanaC  03/24/22 1421

## 2022-04-28 RX ORDER — HYDROCORTISONE 25 MG/G
CREAM TOPICAL 4 TIMES DAILY PRN
COMMUNITY

## 2022-04-28 RX ORDER — ONDANSETRON HYDROCHLORIDE 8 MG/1
TABLET, FILM COATED ORAL EVERY 8 HOURS PRN
COMMUNITY

## 2022-07-02 ENCOUNTER — APPOINTMENT (OUTPATIENT)
Dept: GENERAL RADIOLOGY | Facility: HOSPITAL | Age: 52
End: 2022-07-02

## 2022-07-02 ENCOUNTER — HOSPITAL ENCOUNTER (EMERGENCY)
Facility: HOSPITAL | Age: 52
Discharge: HOME | End: 2022-07-02
Attending: EMERGENCY MEDICINE | Admitting: EMERGENCY MEDICINE

## 2022-07-02 VITALS
HEART RATE: 89 BPM | RESPIRATION RATE: 18 BRPM | WEIGHT: 196 LBS | BODY MASS INDEX: 41.14 KG/M2 | TEMPERATURE: 97.6 F | OXYGEN SATURATION: 95 % | SYSTOLIC BLOOD PRESSURE: 130 MMHG | DIASTOLIC BLOOD PRESSURE: 86 MMHG | HEIGHT: 58 IN

## 2022-07-02 DIAGNOSIS — S29.9XXA RIB INJURY: Primary | ICD-10-CM

## 2022-07-02 PROCEDURE — 71101 X-RAY EXAM UNILAT RIBS/CHEST: CPT

## 2022-07-02 PROCEDURE — 99283 EMERGENCY DEPT VISIT LOW MDM: CPT

## 2022-07-02 PROCEDURE — 96372 THER/PROPH/DIAG INJ SC/IM: CPT

## 2022-07-02 PROCEDURE — 25010000002 KETOROLAC TROMETHAMINE PER 15 MG: Performed by: EMERGENCY MEDICINE

## 2022-07-02 RX ORDER — KETOROLAC TROMETHAMINE 30 MG/ML
60 INJECTION, SOLUTION INTRAMUSCULAR; INTRAVENOUS ONCE
Status: COMPLETED | OUTPATIENT
Start: 2022-07-02 | End: 2022-07-02

## 2022-07-02 RX ORDER — HYDROCODONE BITARTRATE AND ACETAMINOPHEN 5; 325 MG/1; MG/1
1 TABLET ORAL EVERY 6 HOURS PRN
Qty: 8 TABLET | Refills: 0 | Status: SHIPPED | OUTPATIENT
Start: 2022-07-02

## 2022-07-02 RX ORDER — LIDOCAINE 50 MG/G
1 PATCH TOPICAL EVERY 24 HOURS
Qty: 15 EACH | Refills: 0 | Status: SHIPPED | OUTPATIENT
Start: 2022-07-02

## 2022-07-02 RX ORDER — LIDOCAINE 50 MG/G
1 PATCH TOPICAL
Status: DISCONTINUED | OUTPATIENT
Start: 2022-07-02 | End: 2022-07-02 | Stop reason: HOSPADM

## 2022-07-02 RX ADMIN — KETOROLAC TROMETHAMINE 60 MG: 60 INJECTION, SOLUTION INTRAMUSCULAR at 17:11

## 2022-07-02 RX ADMIN — LIDOCAINE 1 PATCH: 50 PATCH CUTANEOUS at 17:12

## 2022-07-02 NOTE — ED PROVIDER NOTES
"Subjective   51-year-old female presenting with left rib pain.  She states that 3 days ago she was bending over into her washing machine and \"cracked\" her left rib.  Since then has had increasing pain, sharp pain, does not radiate, worse with deep inspiration and moving.  No alleviating factors.  No shortness of breath, fevers, chills, nausea, vomiting or other complaints.          Review of Systems   Musculoskeletal: Positive for arthralgias.   All other systems reviewed and are negative.      Past Medical History:   Diagnosis Date   • Anxiety    • Arthritis    • Depression    • Fibromyalgia, primary    • Fissure, anorectal    • Glucose intolerance    • Headache    • Hepatitis-C    • Hyperlipidemia    • Infectious viral hepatitis    • Kidney stone    • LAW (obstructive sleep apnea)    • Substance abuse (HCC)    • Suicide attempt (HCC)        Allergies   Allergen Reactions   • Cymbalta [Duloxetine Hcl] Dizziness   • Naproxen Other (See Comments)     To be discussed     • Tramadol Other (See Comments)     \"Sick\"       Past Surgical History:   Procedure Laterality Date   • ANAL FISSURECTOMY     •  SECTION     • CHOLECYSTECTOMY     • KIDNEY STONE SURGERY         Family History   Problem Relation Age of Onset   • Arthritis Mother    • Depression Mother    • Arthritis Father    • Thyroid disease Father    • Migraines Sister    • Anxiety disorder Sister    • Depression Sister    • Cancer Maternal Grandmother    • ADD / ADHD Son    • OCD Son    • Anxiety disorder Sister    • Depression Sister    • Self-Injurious Behavior  Daughter    • Suicide Attempts Daughter    • Alcohol abuse Neg Hx    • Bipolar disorder Neg Hx    • Dementia Neg Hx    • Drug abuse Neg Hx    • Paranoid behavior Neg Hx    • Schizophrenia Neg Hx    • Seizures Neg Hx        Social History     Socioeconomic History   • Marital status: Legally    Tobacco Use   • Smoking status: Current Every Day Smoker     Packs/day: 1.00     Types: " Cigarettes   • Smokeless tobacco: Never Used   Vaping Use   • Vaping Use: Never used   Substance and Sexual Activity   • Alcohol use: No   • Drug use: Not Currently   • Sexual activity: Defer           Objective   Physical Exam  Constitutional:       General: She is not in acute distress.     Appearance: Normal appearance. She is not ill-appearing, toxic-appearing or diaphoretic.   HENT:      Head: Normocephalic and atraumatic.      Right Ear: External ear normal.      Left Ear: External ear normal.      Nose: Nose normal.   Eyes:      Extraocular Movements: Extraocular movements intact.   Cardiovascular:      Rate and Rhythm: Normal rate and regular rhythm.      Pulses: Normal pulses.      Heart sounds: Normal heart sounds.   Pulmonary:      Effort: Pulmonary effort is normal. No respiratory distress.      Breath sounds: Normal breath sounds.   Abdominal:      General: Bowel sounds are normal. There is no distension.      Tenderness: There is no abdominal tenderness.   Musculoskeletal:         General: No swelling or deformity. Normal range of motion.      Cervical back: Normal range of motion.      Comments: Tenderness left anterior lower ribs, no crepitus   Skin:     General: Skin is warm and dry.      Capillary Refill: Capillary refill takes less than 2 seconds.      Findings: No rash.   Neurological:      General: No focal deficit present.      Mental Status: She is alert and oriented to person, place, and time.   Psychiatric:         Mood and Affect: Mood normal.         Behavior: Behavior normal.         Procedures           ED Course                                           MDM  Number of Diagnoses or Management Options  Rib injury  Diagnosis management comments: 51-year-old female with rib injury.  Well-developed, well-nourished obese lady in no distress with exam as above.  Her vital signs are normal.  Her exam is notable for some left anterior rib tenderness.  Will obtain x-ray and treat symptomatically.   Disposition is likely to home.    DDx: Contusion, fracture, musculoskeletal pain    X-ray per my interpretation no acute fractures, no pneumothorax.  Will discharge home with supportive measures and outpatient follow-up.      Final diagnoses:   Rib injury          Holland Van MD  07/02/22 1096

## 2024-08-13 ENCOUNTER — TRANSCRIBE ORDERS (OUTPATIENT)
Dept: ADMINISTRATIVE | Facility: HOSPITAL | Age: 54
End: 2024-08-13
Payer: MEDICAID

## 2024-08-13 DIAGNOSIS — R92.8 ABNORMAL MAMMOGRAM: Primary | ICD-10-CM
